# Patient Record
Sex: FEMALE | Race: WHITE | NOT HISPANIC OR LATINO | Employment: OTHER | URBAN - METROPOLITAN AREA
[De-identification: names, ages, dates, MRNs, and addresses within clinical notes are randomized per-mention and may not be internally consistent; named-entity substitution may affect disease eponyms.]

---

## 2018-04-24 ENCOUNTER — HOSPITAL ENCOUNTER (OUTPATIENT)
Facility: CLINIC | Age: 68
Discharge: HOME | End: 2018-04-24
Attending: FAMILY MEDICINE
Payer: MEDICARE

## 2018-04-24 ENCOUNTER — HOSPITAL ENCOUNTER (EMERGENCY)
Facility: HOSPITAL | Age: 68
Discharge: HOME | End: 2018-04-24
Attending: EMERGENCY MEDICINE
Payer: MEDICARE

## 2018-04-24 VITALS
DIASTOLIC BLOOD PRESSURE: 75 MMHG | OXYGEN SATURATION: 100 % | HEIGHT: 60 IN | RESPIRATION RATE: 18 BRPM | HEART RATE: 80 BPM | BODY MASS INDEX: 23.56 KG/M2 | TEMPERATURE: 98.6 F | SYSTOLIC BLOOD PRESSURE: 148 MMHG | WEIGHT: 120 LBS

## 2018-04-24 VITALS
SYSTOLIC BLOOD PRESSURE: 150 MMHG | TEMPERATURE: 98 F | RESPIRATION RATE: 16 BRPM | DIASTOLIC BLOOD PRESSURE: 80 MMHG | OXYGEN SATURATION: 98 % | HEART RATE: 78 BPM

## 2018-04-24 DIAGNOSIS — R21 RASH: Primary | ICD-10-CM

## 2018-04-24 PROCEDURE — 99213 OFFICE O/P EST LOW 20 MIN: CPT | Performed by: FAMILY MEDICINE

## 2018-04-24 PROCEDURE — 99281 EMR DPT VST MAYX REQ PHY/QHP: CPT

## 2018-04-24 RX ORDER — METOPROLOL SUCCINATE 100 MG/1
TABLET, EXTENDED RELEASE ORAL
COMMUNITY
End: 2019-01-30 | Stop reason: ENTERED-IN-ERROR

## 2018-04-24 RX ORDER — LEVOTHYROXINE SODIUM 100 UG/1
TABLET ORAL
COMMUNITY
End: 2019-01-30 | Stop reason: ENTERED-IN-ERROR

## 2018-04-24 RX ORDER — FLUTICASONE PROPIONATE 50 MCG
SPRAY, SUSPENSION (ML) NASAL
COMMUNITY
Start: 2017-05-15 | End: 2018-04-24

## 2018-04-24 RX ORDER — PREGABALIN 100 MG/1
100 CAPSULE ORAL
COMMUNITY
End: 2019-01-30 | Stop reason: ENTERED-IN-ERROR

## 2018-04-24 RX ORDER — LORAZEPAM 0.5 MG/1
TABLET ORAL
COMMUNITY
End: 2019-01-30 | Stop reason: ENTERED-IN-ERROR

## 2018-04-24 RX ORDER — GUAIFENESIN 600 MG/1
600 TABLET, EXTENDED RELEASE ORAL
COMMUNITY
Start: 2017-05-15 | End: 2018-04-24

## 2018-04-24 ASSESSMENT — ENCOUNTER SYMPTOMS
SORE THROAT: 0
TREMORS: 0
WEAKNESS: 0
NAUSEA: 0
VOMITING: 0
LIGHT-HEADEDNESS: 0
FACIAL SWELLING: 0
DIZZINESS: 0
PALPITATIONS: 0
SPEECH DIFFICULTY: 0
VOICE CHANGE: 0
ARTHRALGIAS: 1
SHORTNESS OF BREATH: 0
FEVER: 0
TROUBLE SWALLOWING: 0
DIARRHEA: 0
CONFUSION: 0
SINUS PAIN: 1
SINUS PRESSURE: 1
CHEST TIGHTNESS: 0
CHILLS: 0
ABDOMINAL PAIN: 0
HEADACHES: 1

## 2018-04-24 NOTE — ED ATTESTATION NOTE
Procedures  Physical Exam  Review of Systems    4/24/20187:21 PM  I have personally seen and examined the patient.  I reviewed and agree with the PA/NP/Resident's assessment and plan of care.    My examination, assessment, and plan of care of Randi Saul is as follows:    The patient presents with on the face both malar areas and the bridge of the nose is affected is a little bit underneath the nose as well.  She did put some cream on it last night and she thinks maybe this caused a reaction.  She also had a cortisone shot in her right shoulder.  She has gone to urgent care and she was sent here for further evaluation.    Exam: On exam there is an erythematous rash both malar areas over the bridge of the nose also under the nose.  Area feels a little bit warm to touch.  There are a few blood vessels that look to be slightly dilated.  But all in all a fairly smooth erythematous area.  Does not appear to be causing much distress.    Impression/Plan: Fairly nonspecific rash in the malar area.  It may be a reaction to the cream that she was using yesterday.  She may just let it go and it should get better.  Or she could use some hydrocortisone cream.  Or some Benadryl p.o.       Rojas Guzman MD  04/24/18 1923

## 2018-04-24 NOTE — DISCHARGE INSTRUCTIONS
Please return to the emergency department with any new or concerning symptoms. You should take all medications as prescribed.  We have answered all of your questions and you are competent to understand.  You feel comfortable with the plan as stated.     May have reacted to cream you have been using.  He should avoid this for the next several days.  If you are concerned about additional allergy she will follow-up with an allergist for skin testing.  ED physician at the urgent care mentioned possible rheumatologic causes to her rash and joint pain and this can be further evaluated as an outpatient.  He should follow-up with your PCP for further discussion.  He can use topical corticosteroid creams if needed sold over-the-counter.  Benadryl 25 mg every 8 hours can be used for any itching.  Do not apply anything to the face for the next 24 hours.

## 2018-04-24 NOTE — ED PROVIDER NOTES
HPI     Chief Complaint   Patient presents with   • Medication Reaction       67-year-old female presented emergency department with rash on her face that started around 10:00 in the morning after 15 minutes after putting on a vitamin C type cream.  She reports looking the mirror noticing this redness.  She washed her face and then put makeup on.  Throughout the day she noticed her face stayed red.  She had some burning sensation.  She also noticed a pressure headache over her frontal sinuses.  She reports is common for her in the spring.  She has no vision changes or change in mental status.  She is no trouble swallowing.  She is no swelling of her tongue or her lips.  She was seen at the urgent care and referred to emergency department inferior for reacting to steroids if they were given.  The patient took 25 mg of Benadryl around 10:00 this morning.  She is not out in the sun today.  She recent cortisone shot in her right shoulder for arthritis.  She has been having shoulder pain for several years.             Patient History     Past Medical History:   Diagnosis Date   • Diverticulitis of colon    • Fibromuscular dysplasia (CMS/HCC) (HCC)    • Hypertension    • Hypothyroidism        Past Surgical History:   Procedure Laterality Date   • COLON SURGERY  06/25/2013       History reviewed. No pertinent family history.    Social History   Substance Use Topics   • Smoking status: Never Smoker   • Smokeless tobacco: Never Used   • Alcohol use 4.2 oz/week     7 Glasses of wine per week       Systems Reviewed from Nursing Triage:          Review of Systems     Review of Systems   Constitutional: Negative for chills and fever.   HENT: Positive for sinus pain and sinus pressure. Negative for ear pain, facial swelling, mouth sores, sore throat, tinnitus, trouble swallowing and voice change.    Eyes: Negative for visual disturbance.   Respiratory: Negative for chest tightness and shortness of breath.    Cardiovascular:  Negative for chest pain and palpitations.   Gastrointestinal: Negative for abdominal pain, diarrhea, nausea and vomiting.   Musculoskeletal: Positive for arthralgias (B/L shoulder).   Skin: Positive for rash.   Neurological: Positive for headaches. Negative for dizziness, tremors, speech difficulty, weakness and light-headedness.   Psychiatric/Behavioral: Negative for confusion.        Physical Exam     ED Triage Vitals [04/24/18 1801]   Temp Heart Rate Resp BP SpO2   37 °C (98.6 °F) 80 18 (!) 172/78 100 %      Temp Source Heart Rate Source Patient Position BP Location FiO2 (%) (Set)   Temporal -- Sitting Left upper arm --       Pulse Ox %: 100 % (04/24/18 1926)  Pulse Ox Interpretation: Normal (04/24/18 1926)          Patient Vitals for the past 24 hrs:   BP Temp Temp src Pulse Resp SpO2 Height Weight   04/24/18 1801 (!) 172/78 37 °C (98.6 °F) Temporal 80 18 100 % 1.524 m (5') 54.4 kg (120 lb)           Physical Exam   Constitutional: She is oriented to person, place, and time. She appears well-developed and well-nourished.   HENT:   Head: Normocephalic and atraumatic.   There is mild redness in the maxillary area bilaterally.  Does not appear butterfly like.  No oral swelling or lesions noted.  No angioedema.  No tongue swelling.  Normal voice and tone.   Eyes: Conjunctivae and EOM are normal. Pupils are equal, round, and reactive to light.   Cardiovascular: Normal rate and regular rhythm.    Pulmonary/Chest: Effort normal and breath sounds normal.   Neurological: She is alert and oriented to person, place, and time.   Skin: Skin is warm and dry.   Psychiatric: She has a normal mood and affect.            Procedures    ED Course & MDM     Labs Reviewed - No data to display    No orders to display           MDM         ED Course as of Apr 24 1930 Tue Apr 24, 2018 1905 Impression: Facial rash since 10 AM.  Mild burning.  Plan: Irrigate the face.    The care and workup of this patient was discussed with the  attending physician Dr. Guzman  [MW]   1926 Only minimal erythema noted.  Consider PMR or other rheumatologic causes to the joint pain and rash but this does not need to be an emergent workup.  The patient will follow up with her PCP for further evaluation.  [MW]      ED Course User Index  [MW] GEOVANNA Coles         Clinical Impressions as of Apr 24 1930   Rash     Disposition:  Discharge     GEOVANNA Coles  04/25/18 0807

## 2018-04-25 ASSESSMENT — ENCOUNTER SYMPTOMS
GASTROINTESTINAL NEGATIVE: 1
RESPIRATORY NEGATIVE: 1
EYES NEGATIVE: 1
CONSTITUTIONAL NEGATIVE: 1
DYSURIA: 0
CARDIOVASCULAR NEGATIVE: 1
NEUROLOGICAL NEGATIVE: 1
FREQUENCY: 0

## 2018-04-25 NOTE — ED PROVIDER NOTES
History  No chief complaint on file.    67-year-old female presented emergency department with rash on her face that started around 10:00 in the morning after 15 minutes after putting on a vitamin C/radiant  type cream.  She reports looking the mirror noticing this redness.  She washed her face and then put makeup on.  Throughout the day she noticed her face stayed red.  She had some burning sensation.  She is no trouble swallowing.  Has slight redness of the upper lip.denied sob .   The patient took 25 mg of Benadryl around 10:00 this morning.  She had recent cortisone shot in her right shoulder for arthritis.  She has been having shoulder pain for several years.            Past Medical History:   Diagnosis Date   • Diverticulitis of colon    • Fibromuscular dysplasia (CMS/HCC) (HCC)    • Hypertension    • Hypothyroidism        Past Surgical History:   Procedure Laterality Date   • COLON SURGERY  06/25/2013       History reviewed. No pertinent family history.    Social History   Substance Use Topics   • Smoking status: Never Smoker   • Smokeless tobacco: Never Used   • Alcohol use 4.2 oz/week     7 Glasses of wine per week       Review of Systems   Constitutional: Negative.    HENT: Negative.    Eyes: Negative.    Respiratory: Negative.    Cardiovascular: Negative.    Gastrointestinal: Negative.    Genitourinary: Negative for dysuria, frequency and urgency.   Skin: Positive for rash.   Neurological: Negative.        Physical Exam  ED Triage Vitals [04/24/18 1658]   Temp Heart Rate Resp BP SpO2   36.7 °C (98 °F) 78 16 (!) 150/80 98 %      Temp src Heart Rate Source Patient Position BP Location FiO2 (%) (Set)   -- -- -- -- --       Physical Exam   Constitutional: No distress.   HENT:   Nose: Nose normal.   Mouth/Throat: Oropharynx is clear and moist. No oropharyngeal exudate.   Eyes: Conjunctivae are normal. Pupils are equal, round, and reactive to light.   Cardiovascular: Normal rate, regular rhythm and normal heart  sounds.    Pulmonary/Chest: Effort normal and breath sounds normal.   Skin:   Face : has erythema both cheeks and also just above the upper lip   But no lip swelling            Procedures  Procedures    UC Course  ED Course          Clinical Impressions as of Apr 25 0924   Rash       MDM  Number of Diagnoses or Management Options  Rash:   Diagnosis management comments: Given her concern of may be the steroid injection which she got for her Rt shoulder yesterday caused this or may be the face cream caused it ,she already took benadryl I advised to go to ER ,she and her  agreed ,so I didn't give any steroids here ,no signs of anaphylaxis  Called ER                  Benita Robison MD  04/25/18 4137

## 2019-01-29 ENCOUNTER — TRANSCRIBE ORDERS (OUTPATIENT)
Dept: SCHEDULING | Age: 69
End: 2019-01-29

## 2019-01-29 DIAGNOSIS — I10 ESSENTIAL (PRIMARY) HYPERTENSION: ICD-10-CM

## 2019-01-29 DIAGNOSIS — E03.9 HYPOTHYROIDISM: Primary | ICD-10-CM

## 2019-01-29 DIAGNOSIS — R42 DIZZINESS AND GIDDINESS: ICD-10-CM

## 2019-01-29 DIAGNOSIS — I77.3 ARTERIAL FIBROMUSCULAR DYSPLASIA (CMS/HCC): ICD-10-CM

## 2019-01-30 ENCOUNTER — HOSPITAL ENCOUNTER (INPATIENT)
Facility: HOSPITAL | Age: 69
LOS: 1 days | Discharge: HOME | DRG: 149 | End: 2019-02-01
Attending: EMERGENCY MEDICINE | Admitting: HOSPITALIST
Payer: MEDICARE

## 2019-01-30 DIAGNOSIS — I77.3 FIBROMUSCULAR DYSPLASIA (CMS/HCC): ICD-10-CM

## 2019-01-30 DIAGNOSIS — R42 LIGHTHEADEDNESS: Primary | ICD-10-CM

## 2019-01-30 DIAGNOSIS — E87.1 HYPONATREMIA: ICD-10-CM

## 2019-01-30 PROBLEM — F41.9 ANXIETY: Status: ACTIVE | Noted: 2019-01-30

## 2019-01-30 PROBLEM — I10 ESSENTIAL HYPERTENSION: Status: ACTIVE | Noted: 2019-01-30

## 2019-01-30 LAB
ALBUMIN SERPL-MCNC: 4.6 G/DL (ref 3.4–5)
ALP SERPL-CCNC: 60 IU/L (ref 35–126)
ALT SERPL-CCNC: 20 IU/L (ref 11–54)
ANION GAP SERPL CALC-SCNC: 12 MEQ/L (ref 3–15)
AST SERPL-CCNC: 23 IU/L (ref 15–41)
BASOPHILS # BLD: 0.06 K/UL (ref 0.01–0.1)
BASOPHILS NFR BLD: 1.3 %
BILIRUB SERPL-MCNC: 1.4 MG/DL (ref 0.3–1.2)
BUN SERPL-MCNC: 18 MG/DL (ref 8–20)
CALCIUM SERPL-MCNC: 9.9 MG/DL (ref 8.9–10.3)
CHLORIDE SERPL-SCNC: 97 MEQ/L (ref 98–109)
CO2 SERPL-SCNC: 23 MEQ/L (ref 22–32)
CREAT SERPL-MCNC: 0.8 MG/DL
DIFFERENTIAL METHOD BLD: ABNORMAL
EOSINOPHIL # BLD: 0.03 K/UL (ref 0.04–0.36)
EOSINOPHIL NFR BLD: 0.6 %
ERYTHROCYTE [DISTWIDTH] IN BLOOD BY AUTOMATED COUNT: 11.5 % (ref 11.7–14.4)
GFR SERPL CREATININE-BSD FRML MDRD: >60 ML/MIN/1.73M*2
GLUCOSE SERPL-MCNC: 96 MG/DL (ref 70–99)
HCT VFR BLDCO AUTO: 38.3 %
HGB BLD-MCNC: 13.5 G/DL
IMM GRANULOCYTES # BLD AUTO: 0.02 K/UL (ref 0–0.08)
IMM GRANULOCYTES NFR BLD AUTO: 0.4 %
LYMPHOCYTES # BLD: 0.95 K/UL (ref 1.2–3.5)
LYMPHOCYTES NFR BLD: 20.3 %
MAGNESIUM SERPL-MCNC: 2.1 MG/DL (ref 1.8–2.5)
MCH RBC QN AUTO: 31.4 PG (ref 28–33.2)
MCHC RBC AUTO-ENTMCNC: 35.2 G/DL (ref 32.2–35.5)
MCV RBC AUTO: 89.1 FL (ref 83–98)
MONOCYTES # BLD: 0.26 K/UL (ref 0.28–0.8)
MONOCYTES NFR BLD: 5.6 %
NEUTROPHILS # BLD: 3.36 K/UL (ref 1.7–7)
NEUTS SEG NFR BLD: 71.8 %
NRBC BLD-RTO: 0 %
PDW BLD AUTO: 9.1 FL (ref 9.4–12.3)
PLATELET # BLD AUTO: 224 K/UL
POTASSIUM SERPL-SCNC: 3.5 MEQ/L (ref 3.6–5.1)
PROT SERPL-MCNC: 7.4 G/DL (ref 6–8.2)
RBC # BLD AUTO: 4.3 M/UL (ref 3.93–5.22)
SODIUM SERPL-SCNC: 132 MEQ/L (ref 136–144)
TROPONIN I SERPL-MCNC: <0.02 NG/ML
WBC # BLD AUTO: 4.68 K/UL

## 2019-01-30 PROCEDURE — G0378 HOSPITAL OBSERVATION PER HR: HCPCS

## 2019-01-30 PROCEDURE — 80053 COMPREHEN METABOLIC PANEL: CPT | Performed by: EMERGENCY MEDICINE

## 2019-01-30 PROCEDURE — 96360 HYDRATION IV INFUSION INIT: CPT

## 2019-01-30 PROCEDURE — 85025 COMPLETE CBC W/AUTO DIFF WBC: CPT | Performed by: EMERGENCY MEDICINE

## 2019-01-30 PROCEDURE — 84484 ASSAY OF TROPONIN QUANT: CPT | Performed by: EMERGENCY MEDICINE

## 2019-01-30 PROCEDURE — 63600000 HC DRUGS/DETAIL CODE: Performed by: HOSPITALIST

## 2019-01-30 PROCEDURE — 63700000 HC SELF-ADMINISTRABLE DRUG: Performed by: HOSPITALIST

## 2019-01-30 PROCEDURE — 99285 EMERGENCY DEPT VISIT HI MDM: CPT | Mod: 25

## 2019-01-30 PROCEDURE — 83735 ASSAY OF MAGNESIUM: CPT | Performed by: HOSPITALIST

## 2019-01-30 PROCEDURE — 96372 THER/PROPH/DIAG INJ SC/IM: CPT

## 2019-01-30 PROCEDURE — 63700000 HC SELF-ADMINISTRABLE DRUG: Performed by: PHYSICIAN ASSISTANT

## 2019-01-30 PROCEDURE — 93005 ELECTROCARDIOGRAM TRACING: CPT | Performed by: PHYSICIAN ASSISTANT

## 2019-01-30 PROCEDURE — 25800000 HC PHARMACY IV SOLUTIONS: Performed by: PHYSICIAN ASSISTANT

## 2019-01-30 PROCEDURE — 85025 COMPLETE CBC W/AUTO DIFF WBC: CPT

## 2019-01-30 PROCEDURE — 99220 PR INITIAL OBSERVATION CARE/DAY 70 MINUTES: CPT | Performed by: HOSPITALIST

## 2019-01-30 PROCEDURE — 25800000 HC PHARMACY IV SOLUTIONS: Performed by: HOSPITALIST

## 2019-01-30 PROCEDURE — 36415 COLL VENOUS BLD VENIPUNCTURE: CPT

## 2019-01-30 RX ORDER — METOPROLOL SUCCINATE 25 MG/1
12.5 TABLET, EXTENDED RELEASE ORAL DAILY
Status: DISCONTINUED | OUTPATIENT
Start: 2019-01-30 | End: 2019-01-31

## 2019-01-30 RX ORDER — PREGABALIN 75 MG/1
75 CAPSULE ORAL EVERY EVENING
COMMUNITY

## 2019-01-30 RX ORDER — LORAZEPAM 0.5 MG/1
0.5 TABLET ORAL DAILY
Status: DISCONTINUED | OUTPATIENT
Start: 2019-01-30 | End: 2019-02-01 | Stop reason: HOSPADM

## 2019-01-30 RX ORDER — PREGABALIN 50 MG/1
50 CAPSULE ORAL 2 TIMES DAILY
COMMUNITY

## 2019-01-30 RX ORDER — SODIUM CHLORIDE 9 MG/ML
INJECTION, SOLUTION INTRAVENOUS CONTINUOUS
Status: ACTIVE | OUTPATIENT
Start: 2019-01-30 | End: 2019-01-31

## 2019-01-30 RX ORDER — DEXTROSE 40 %
15-30 GEL (GRAM) ORAL AS NEEDED
Status: DISCONTINUED | OUTPATIENT
Start: 2019-01-30 | End: 2019-02-01 | Stop reason: HOSPADM

## 2019-01-30 RX ORDER — POTASSIUM CHLORIDE 750 MG/1
20 TABLET, FILM COATED, EXTENDED RELEASE ORAL AS NEEDED
Status: DISCONTINUED | OUTPATIENT
Start: 2019-01-30 | End: 2019-02-01 | Stop reason: HOSPADM

## 2019-01-30 RX ORDER — PREGABALIN 75 MG/1
75 CAPSULE ORAL
Status: DISCONTINUED | OUTPATIENT
Start: 2019-01-30 | End: 2019-02-01 | Stop reason: HOSPADM

## 2019-01-30 RX ORDER — POTASSIUM CHLORIDE 14.9 MG/ML
20 INJECTION INTRAVENOUS AS NEEDED
Status: DISCONTINUED | OUTPATIENT
Start: 2019-01-30 | End: 2019-02-01 | Stop reason: HOSPADM

## 2019-01-30 RX ORDER — DEXTROSE 50 % IN WATER (D50W) INTRAVENOUS SYRINGE
25 AS NEEDED
Status: DISCONTINUED | OUTPATIENT
Start: 2019-01-30 | End: 2019-02-01 | Stop reason: HOSPADM

## 2019-01-30 RX ORDER — NITROGLYCERIN 0.4 MG/1
0.4 TABLET SUBLINGUAL EVERY 5 MIN PRN
Status: DISCONTINUED | OUTPATIENT
Start: 2019-01-30 | End: 2019-02-01 | Stop reason: HOSPADM

## 2019-01-30 RX ORDER — LEVOTHYROXINE SODIUM 100 UG/1
100 TABLET ORAL
Status: DISCONTINUED | OUTPATIENT
Start: 2019-01-31 | End: 2019-02-01 | Stop reason: HOSPADM

## 2019-01-30 RX ORDER — MECLIZINE HYDROCHLORIDE 25 MG/1
25 TABLET ORAL 2 TIMES DAILY PRN
Status: ON HOLD | COMMUNITY
End: 2019-04-10

## 2019-01-30 RX ORDER — ATROPINE SULFATE 0.1 MG/ML
0.5 INJECTION INTRAVENOUS EVERY 5 MIN PRN
Status: DISCONTINUED | OUTPATIENT
Start: 2019-01-30 | End: 2019-02-01 | Stop reason: HOSPADM

## 2019-01-30 RX ORDER — METOPROLOL SUCCINATE 100 MG/1
100 TABLET, EXTENDED RELEASE ORAL DAILY
Status: DISCONTINUED | OUTPATIENT
Start: 2019-01-30 | End: 2019-01-30

## 2019-01-30 RX ORDER — LOSARTAN POTASSIUM AND HYDROCHLOROTHIAZIDE 12.5; 5 MG/1; MG/1
2 TABLET ORAL EVERY MORNING
Status: ON HOLD | COMMUNITY
End: 2019-02-01

## 2019-01-30 RX ORDER — IBUPROFEN 200 MG
16-32 TABLET ORAL AS NEEDED
Status: DISCONTINUED | OUTPATIENT
Start: 2019-01-30 | End: 2019-02-01 | Stop reason: HOSPADM

## 2019-01-30 RX ORDER — SENNOSIDES 8.6 MG/1
1 TABLET ORAL 2 TIMES DAILY PRN
Status: DISCONTINUED | OUTPATIENT
Start: 2019-01-30 | End: 2019-02-01 | Stop reason: HOSPADM

## 2019-01-30 RX ORDER — MAGNESIUM SULFATE HEPTAHYDRATE 40 MG/ML
2 INJECTION, SOLUTION INTRAVENOUS AS NEEDED
Status: DISCONTINUED | OUTPATIENT
Start: 2019-01-30 | End: 2019-01-31

## 2019-01-30 RX ORDER — ENOXAPARIN SODIUM 100 MG/ML
40 INJECTION SUBCUTANEOUS
Status: DISCONTINUED | OUTPATIENT
Start: 2019-01-30 | End: 2019-02-01 | Stop reason: HOSPADM

## 2019-01-30 RX ORDER — POTASSIUM CHLORIDE 750 MG/1
40 TABLET, FILM COATED, EXTENDED RELEASE ORAL ONCE
Status: COMPLETED | OUTPATIENT
Start: 2019-01-30 | End: 2019-01-30

## 2019-01-30 RX ORDER — PREGABALIN 100 MG/1
100 CAPSULE ORAL 3 TIMES DAILY
Status: DISCONTINUED | OUTPATIENT
Start: 2019-01-30 | End: 2019-01-30

## 2019-01-30 RX ORDER — LEVOTHYROXINE SODIUM 100 UG/1
100 TABLET ORAL
COMMUNITY

## 2019-01-30 RX ORDER — LORAZEPAM 0.5 MG/1
0.5 TABLET ORAL 3 TIMES DAILY
COMMUNITY

## 2019-01-30 RX ORDER — PREGABALIN 50 MG/1
50 CAPSULE ORAL 2 TIMES DAILY
Status: DISCONTINUED | OUTPATIENT
Start: 2019-01-31 | End: 2019-02-01 | Stop reason: HOSPADM

## 2019-01-30 RX ORDER — ACETAMINOPHEN 325 MG/1
650 TABLET ORAL EVERY 4 HOURS PRN
Status: DISCONTINUED | OUTPATIENT
Start: 2019-01-30 | End: 2019-02-01 | Stop reason: HOSPADM

## 2019-01-30 RX ORDER — METOPROLOL SUCCINATE 25 MG/1
12.5 TABLET, EXTENDED RELEASE ORAL NIGHTLY
COMMUNITY
End: 2019-07-10 | Stop reason: HOSPADM

## 2019-01-30 RX ORDER — POTASSIUM CHLORIDE 750 MG/1
40 TABLET, FILM COATED, EXTENDED RELEASE ORAL AS NEEDED
Status: DISCONTINUED | OUTPATIENT
Start: 2019-01-30 | End: 2019-02-01 | Stop reason: HOSPADM

## 2019-01-30 RX ORDER — ASPIRIN 81 MG/1
81 TABLET ORAL DAILY
Status: DISCONTINUED | OUTPATIENT
Start: 2019-01-30 | End: 2019-02-01 | Stop reason: HOSPADM

## 2019-01-30 RX ADMIN — POTASSIUM CHLORIDE 40 MEQ: 750 TABLET, EXTENDED RELEASE ORAL at 14:07

## 2019-01-30 RX ADMIN — PREGABALIN 75 MG: 75 CAPSULE ORAL at 21:30

## 2019-01-30 RX ADMIN — SODIUM CHLORIDE 1000 ML: 9 INJECTION, SOLUTION INTRAVENOUS at 14:07

## 2019-01-30 RX ADMIN — METOPROLOL SUCCINATE 12.5 MG: 25 TABLET, EXTENDED RELEASE ORAL at 20:11

## 2019-01-30 RX ADMIN — ASPIRIN 81 MG: 81 TABLET, COATED ORAL at 20:11

## 2019-01-30 RX ADMIN — ENOXAPARIN SODIUM 40 MG: 100 INJECTION SUBCUTANEOUS at 20:13

## 2019-01-30 RX ADMIN — ACETAMINOPHEN 650 MG: 325 TABLET ORAL at 18:32

## 2019-01-30 RX ADMIN — SODIUM CHLORIDE: 9 INJECTION, SOLUTION INTRAVENOUS at 20:14

## 2019-01-30 RX ADMIN — LORAZEPAM 0.5 MG: 0.5 TABLET ORAL at 20:13

## 2019-01-30 ASSESSMENT — ENCOUNTER SYMPTOMS
ABDOMINAL PAIN: 0
AGITATION: 0
HEMATURIA: 0
APPETITE CHANGE: 0
NUMBNESS: 0
FREQUENCY: 0
LIGHT-HEADEDNESS: 1
WEAKNESS: 0
COUGH: 0
DYSURIA: 0
SORE THROAT: 0
VOMITING: 0
NECK PAIN: 0
SINUS PRESSURE: 0
JOINT SWELLING: 0
FACIAL ASYMMETRY: 0
SEIZURES: 0
EYE PAIN: 0
DIZZINESS: 1
NAUSEA: 1
HEADACHES: 1
CHILLS: 0
SHORTNESS OF BREATH: 0
DIARRHEA: 0
RHINORRHEA: 0
FEVER: 0
BACK PAIN: 0

## 2019-01-30 NOTE — ED ATTESTATION NOTE
The patient was evaluated and managed by the PA/NP.  I have discussed the case with the PA/NP.  I am in agreement with the ED workup and treatment plan.  I will continue to be available for consultation as needed.     Godshall, Duane K, MD  01/30/19 5956

## 2019-01-30 NOTE — ASSESSMENT & PLAN NOTE
Refused higher dose of Hyzaar  BP is acceptable on regular dose   Also on small dose of Toprol  To monitor

## 2019-01-30 NOTE — ED PROVIDER NOTES
"HPI     Chief Complaint   Patient presents with   • Dizziness       69 y/o F presents to the ED for evaluation of episodic light headedness that has been ongoing for the past 4 days.  Sensation lasts briefly for a few seconds with associated \"inabiity to talk\" and pallor.  Patient states she feels like she is going to pass out during episodes.  Patient was evaluated by PCP A[facility] 2 days ago who instructed patient to go to the ED should an episode recur.  In the ED, patient continues with dizziness she describes as feeling off balance.  Patient admits to frequent headaches and nausea for the past several days of which patient took Ativan and Tylenol this morning.  Denies changes to vision, chest pain, shortness of breath, fever, or any other concerns.  History of hypertension of which patient had Losartan doubled 2 days ago.  Patient notes blood pressure has been elevated for the past 4 days.  Patient scheduled to have US of right kidney for fibromuscular dysplasia but came to the ED instead.  Patient was evaluated by cardiologist Dr Mccrary 2 days ago who write script for patient to have echocardiogram and ultrasound of carotids.  History of partial epilepsy.  Last CT Brain with neurologist Dr Dawkins in 1/2018.           History provided by:  Patient   used: No    Dizziness   Quality:  Lightheadedness  Severity:  Moderate  Onset quality:  Gradual  Duration:  4 days  Timing:  Intermittent  Progression:  Waxing and waning  Chronicity:  New  Relieved by:  Nothing  Worsened by:  Nothing  Ineffective treatments:  None tried  Associated symptoms: headaches and nausea    Associated symptoms: no chest pain, no diarrhea, no shortness of breath, no vomiting and no weakness         Patient History     Past Medical History:   Diagnosis Date   • Diverticulitis of colon    • Fibromuscular dysplasia (CMS/HCC) (HCC)    • Hypertension    • Hypothyroidism        Past Surgical History:   Procedure Laterality Date   • " COLON SURGERY  06/25/2013       History reviewed. No pertinent family history.    Social History   Substance Use Topics   • Smoking status: Never Smoker   • Smokeless tobacco: Never Used   • Alcohol use 4.2 oz/week     7 Glasses of wine per week       Systems Reviewed from Nursing Triage:          Review of Systems     Review of Systems   Constitutional: Negative for appetite change, chills and fever.   HENT: Negative for congestion, ear pain, rhinorrhea, sinus pressure and sore throat.    Eyes: Negative for pain.   Respiratory: Negative for cough and shortness of breath.    Cardiovascular: Negative for chest pain and leg swelling.   Gastrointestinal: Positive for nausea. Negative for abdominal pain, diarrhea and vomiting.   Genitourinary: Negative for dysuria, frequency and hematuria.   Musculoskeletal: Negative for back pain, joint swelling and neck pain.   Skin: Positive for pallor. Negative for rash.   Neurological: Positive for dizziness, light-headedness and headaches. Negative for seizures, syncope, facial asymmetry, weakness and numbness.   Psychiatric/Behavioral: Negative for agitation and behavioral problems.        Physical Exam     ED Triage Vitals [01/30/19 1117]   Temp Heart Rate Resp BP SpO2   36.5 °C (97.7 °F) 83 16 (!) 142/79 99 %      Temp Source Heart Rate Source Patient Position BP Location FiO2 (%) (Set)   Oral -- -- -- --                     Patient Vitals for the past 24 hrs:   BP Temp Temp src Pulse Resp SpO2 Height Weight   01/30/19 1117 (!) 142/79 36.5 °C (97.7 °F) Oral 83 16 99 % 1.524 m (5') 54.4 kg (120 lb)           Physical Exam   Constitutional: She is oriented to person, place, and time. She appears well-developed and well-nourished.   HENT:   Head: Normocephalic and atraumatic.   Eyes: Pupils are equal, round, and reactive to light.   Neck: Neck supple.   Cardiovascular: Normal rate, regular rhythm, normal heart sounds and intact distal pulses.    Pulmonary/Chest: Effort normal and  breath sounds normal.   Abdominal: Soft. There is no tenderness.   Musculoskeletal: Normal range of motion. She exhibits no edema.   Neurological: She is alert and oriented to person, place, and time. She has normal strength. No cranial nerve deficit or sensory deficit.   Skin: Skin is warm and dry.   Psychiatric: She has a normal mood and affect. Her behavior is normal.   Nursing note and vitals reviewed.           ECG 12 lead  Date/Time: 1/30/2019 11:07 AM  Performed by: HARIKA SANDOVAL  Authorized by: GODSHALL, DUANE K     ECG reviewed by ED Physician in the absence of a cardiologist: yes    Rate:     ECG rate:  78    ECG rate assessment: normal    Rhythm:     Rhythm: sinus rhythm    Ectopy:     Ectopy: none    QRS:     QRS axis:  Left        ED Course & MDM     Labs Reviewed   CBC AND DIFFERENTIAL    Narrative:     The following orders were created for panel order CBC and differential.  Procedure                               Abnormality         Status                     ---------                               -----------         ------                     CBC[[id number]]                                                                           Diff Count[[id number]]                                                                      Please view results for these tests on the individual orders.   COMPREHENSIVE METABOLIC PANEL   TROPONIN I   RAINBOW DRAW PANEL    Narrative:     The following orders were created for panel order [place] Draw Panel.  Procedure                               Abnormality         Status                     ---------                               -----------         ------                     RAINBOW RED[[id number]]                                                                   RAINBOW LT BLUE[[id number]]                                                               RAINBOW GOLD[[id number]]                                                                    Please view results for these tests on  the individual orders.   CBC   DIFF COUNT   RAINBOW RED   RAINBOW LT BLUE   RAINBOW GOLD       No orders to display               MDM         ED Course as of Jan 30 1736   Wed Jan 30, 2019   1210 Impression: near syncope  Plan: blood work,   [EG]   1326 Discussed with Dr. Bonilla who did not see any other tests in addition that the patient needed to have done here in the emergency room.  He will discuss with Dr. Mccrary and have the patient follow-up with Dr. Mccrary outpatient for her outpatient appointments that she has scheduled already.  [AMRIT]   1438 I discussed results with patient and administered IV fluids.  Patient reports that even though her labs are not abnormal she does not feel well enough to go home.  I informed her that I can have another doctor come down to evaluate her to get a second opinion if she wants to appeal her discharge.  [AMRIT]   1439 I discussed with Dr. Amezquita who will be in to evaluate the patient.  [AMRIT]   1525 Dr Amezquita in to see the patient  [AMRIT]   1707 Case was discussed with hospitalist.  Reviewed patient's presentation, ED course, and relevant data.  Hospitalist accepts patient on their service and will see / admit pt.    [AMRIT]      ED Course User Index  [EG] Deborah Granger  [AMRIT] Dina Rodriguez PA C         Clinical Impressions as of Jan 30 1736   Lightheadedness   Hyponatremia      By signing my name below, I, Deborah Granger, attest that this documentation has been prepared under the direction and in the presence of ERIN Rodriguez PA-C    1/30/2019 11:58 AM      Dina CARVALHO, personally performed the services described in this documentation, as documented by the scribe in my presence, and it is both accurate and complete.  1/30/2019 3:23 PM         Deborah Granger  01/30/19 1209       Dina Rodriguez PA C  01/30/19 1523       Dina Rodriguez PA C  01/30/19 1736

## 2019-01-30 NOTE — ASSESSMENT & PLAN NOTE
She is having intermittent short episodes of lightheadedness and feels that she is going to pass out.  She is also unable to talk during these episodes.   The patient and her  do not believe they are seizures but rather similar to symptoms the patient had before renal artery stenting  As per epilepsy entry

## 2019-01-31 PROBLEM — G40.909 EPILEPSY (CMS/HCC): Status: ACTIVE | Noted: 2019-01-31

## 2019-01-31 PROBLEM — I77.3 FIBROMUSCULAR DYSPLASIA (CMS/HCC): Status: ACTIVE | Noted: 2019-01-31

## 2019-01-31 PROBLEM — R42 LIGHTHEADEDNESS: Status: ACTIVE | Noted: 2019-01-31

## 2019-01-31 LAB
ANION GAP SERPL CALC-SCNC: 7 MEQ/L (ref 3–15)
ATRIAL RATE: 80
BUN SERPL-MCNC: 17 MG/DL (ref 8–20)
CALCIUM SERPL-MCNC: 9.2 MG/DL (ref 8.9–10.3)
CHLORIDE SERPL-SCNC: 110 MEQ/L (ref 98–109)
CO2 SERPL-SCNC: 21 MEQ/L (ref 22–32)
CREAT SERPL-MCNC: 0.6 MG/DL
ERYTHROCYTE [DISTWIDTH] IN BLOOD BY AUTOMATED COUNT: 11.7 % (ref 11.7–14.4)
GFR SERPL CREATININE-BSD FRML MDRD: >60 ML/MIN/1.73M*2
GLUCOSE SERPL-MCNC: 86 MG/DL (ref 70–99)
HCT VFR BLDCO AUTO: 37.2 %
HGB BLD-MCNC: 12.7 G/DL
MCH RBC QN AUTO: 31.1 PG (ref 28–33.2)
MCHC RBC AUTO-ENTMCNC: 34.1 G/DL (ref 32.2–35.5)
MCV RBC AUTO: 91.2 FL (ref 83–98)
P AXIS: 51
PDW BLD AUTO: 9.2 FL (ref 9.4–12.3)
PLATELET # BLD AUTO: 220 K/UL
POTASSIUM SERPL-SCNC: 4.2 MEQ/L (ref 3.6–5.1)
PR INTERVAL: 174
QRS DURATION: 74
QT INTERVAL: 390
QTC CALCULATION(BAZETT): 444
R AXIS: -18
RBC # BLD AUTO: 4.08 M/UL (ref 3.93–5.22)
SODIUM SERPL-SCNC: 138 MEQ/L (ref 136–144)
T WAVE AXIS: 37
VENTRICULAR RATE: 78
WBC # BLD AUTO: 5.03 K/UL

## 2019-01-31 PROCEDURE — 63700000 HC SELF-ADMINISTRABLE DRUG: Performed by: HOSPITALIST

## 2019-01-31 PROCEDURE — 63600000 HC DRUGS/DETAIL CODE: Performed by: HOSPITALIST

## 2019-01-31 PROCEDURE — 63700000 HC SELF-ADMINISTRABLE DRUG: Performed by: PHYSICIAN ASSISTANT

## 2019-01-31 PROCEDURE — 20600000 HC ROOM AND CARE INTERMEDIATE/TELEMETRY

## 2019-01-31 PROCEDURE — 63700000 HC SELF-ADMINISTRABLE DRUG: Performed by: INTERNAL MEDICINE

## 2019-01-31 PROCEDURE — 80048 BASIC METABOLIC PNL TOTAL CA: CPT | Performed by: HOSPITALIST

## 2019-01-31 PROCEDURE — 85027 COMPLETE CBC AUTOMATED: CPT | Performed by: HOSPITALIST

## 2019-01-31 PROCEDURE — G0378 HOSPITAL OBSERVATION PER HR: HCPCS

## 2019-01-31 PROCEDURE — 99232 SBSQ HOSP IP/OBS MODERATE 35: CPT | Performed by: HOSPITALIST

## 2019-01-31 PROCEDURE — 36415 COLL VENOUS BLD VENIPUNCTURE: CPT | Performed by: HOSPITALIST

## 2019-01-31 RX ORDER — CARVEDILOL 3.12 MG/1
3.12 TABLET ORAL 2 TIMES DAILY WITH MEALS
Status: DISCONTINUED | OUTPATIENT
Start: 2019-01-31 | End: 2019-01-31

## 2019-01-31 RX ORDER — LOSARTAN POTASSIUM 50 MG/1
50 TABLET ORAL DAILY
Status: DISCONTINUED | OUTPATIENT
Start: 2019-01-31 | End: 2019-02-01 | Stop reason: HOSPADM

## 2019-01-31 RX ORDER — LORAZEPAM 0.5 MG/1
0.5 TABLET ORAL ONCE
Status: COMPLETED | OUTPATIENT
Start: 2019-01-31 | End: 2019-01-31

## 2019-01-31 RX ORDER — METOPROLOL SUCCINATE 25 MG/1
12.5 TABLET, EXTENDED RELEASE ORAL NIGHTLY
Status: DISCONTINUED | OUTPATIENT
Start: 2019-01-31 | End: 2019-02-01 | Stop reason: HOSPADM

## 2019-01-31 RX ORDER — HYDROCHLOROTHIAZIDE 12.5 MG/1
12.5 TABLET ORAL DAILY
Status: DISCONTINUED | OUTPATIENT
Start: 2019-01-31 | End: 2019-02-01 | Stop reason: HOSPADM

## 2019-01-31 RX ADMIN — HYDROCHLOROTHIAZIDE 12.5 MG: 12.5 TABLET ORAL at 15:12

## 2019-01-31 RX ADMIN — LEVOTHYROXINE SODIUM 100 MCG: 100 TABLET ORAL at 05:43

## 2019-01-31 RX ADMIN — PREGABALIN 50 MG: 50 CAPSULE ORAL at 05:43

## 2019-01-31 RX ADMIN — METOPROLOL SUCCINATE 12.5 MG: 25 TABLET, EXTENDED RELEASE ORAL at 21:55

## 2019-01-31 RX ADMIN — LOSARTAN POTASSIUM 50 MG: 50 TABLET, FILM COATED ORAL at 15:12

## 2019-01-31 RX ADMIN — PREGABALIN 75 MG: 75 CAPSULE ORAL at 21:55

## 2019-01-31 RX ADMIN — ENOXAPARIN SODIUM 40 MG: 100 INJECTION SUBCUTANEOUS at 17:41

## 2019-01-31 RX ADMIN — LORAZEPAM 0.5 MG: 0.5 TABLET ORAL at 09:06

## 2019-01-31 RX ADMIN — PREGABALIN 50 MG: 50 CAPSULE ORAL at 12:56

## 2019-01-31 RX ADMIN — LORAZEPAM 0.5 MG: 0.5 TABLET ORAL at 21:56

## 2019-01-31 RX ADMIN — ASPIRIN 81 MG: 81 TABLET, COATED ORAL at 09:06

## 2019-01-31 NOTE — ASSESSMENT & PLAN NOTE
The patient her  do not believe these current episodes are seizures, and they would like her blood pressure to be monitored.  If she has additional episodes in the hospital, and these are not related to elevated blood pressure spikes, I would suggest a transfer to the [place] epilepsy unit for continuous EEG monitoring, seizure classification, and to adjust her current medications.  She is a great difficulty tolerating medications in the past.  She will continue Lyrica 175 mg daily.  Ativan is not a great medicine for her anxiety, as she will develop tolerance to it, and it is typically used as a rescue medicine for patients with uncontrolled seizures.  May continued in the hospital for now.  She does plan on following up with Dr. Fuller at [place].  She does not drive.

## 2019-01-31 NOTE — CONSULTS
NEUROLOGICAL CONSULTATION      PATIENT NAME:  Randi Saul      YOB: 1950   AGE:  68 y.o.      GENDER: female  MRN:  [id number]          PATIENT #: [id number]    Reason for Consultation:     Requesting service:     HISTORY   68-year-old woman made a [facility] for labile hypertension.  She has a history of refractory epilepsy.    Her primary care physician sent her to the emergency department with recurring episodes of ability to talk, and feeling like she was going to pass out.  Her losartan was increased, and blood pressure has been elevated for the past 4 days with associated headache.   and patient are convinced that her episodes are related to fluctuations in her blood pressure, which I believe is related to her fiber muscular dysplasia, status post renal stenting.      With the onset of menopause, she began having staring episodes.  2012, she was diagnosed with epilepsy by Dr. Chirag Gonzales.  Subsequently followed by Dr. David Fuller at [place], and more recently Dr. Dwight Dawkins.  She has not been able to tolerate medications for her seizures, specifically she has been on Trileptal, Keppra, Dilantin, and lamotrigine.  Currently she is taking Lyrica under 75 mg in divided doses.  Her psychiatrist is also prescribing Ativan 0.5 mg which she is taking once or twice daily for anxiety.  She had never been admitted to [place] for monitoring and seizure classification.  She has wondered if she is a candidate for epilepsy surgery as she is spoken to a peer who has done very well.    With her typical seizure, she will have lip smacking, right hand automatisms, and staring episodes lasting for only a minute.  The patient her  do not believe these current episodes are seizures, and they would like her blood pressure to be monitored.    PAST MEDICAL HISTORY     Past Medical History:   Diagnosis Date   • Diverticulitis of colon    • Fibromuscular dysplasia (CMS/HCC) (HCC)     • Hypertension    • Hypothyroidism        PAST SURGICAL HISTORY     Past Surgical History:   Procedure Laterality Date   • COLON SURGERY  06/25/2013        FAMILY HISTORY   History reviewed. No pertinent family history.    SOCIAL HISTORY     Social History   Substance Use Topics   • Smoking status: Never Smoker   • Smokeless tobacco: Never Used   • Alcohol use 4.2 oz/week     7 Glasses of wine per week       ALLERGIES     Allergies   Allergen Reactions   • Azithromycin Diarrhea   • Levetiracetam      Other reaction(s): sick  cannot tolerate    • Oxycodone GI intolerance   • Amoxicillin Trihydrate Rash   • Potassium Clavulanate Rash   • Sulfa (Sulfonamide Antibiotics) Rash       MEDICATIONS   Home Medication:  •  levothyroxine, Take 100 mcg by mouth daily.  •  LORazepam, Take 0.5 mg by mouth 2 (two) times a day as needed for anxiety.  •  losartan-hydrochlorothiazide, Take 2 tablets by mouth every morning. Increased from 1 tab to 2 tabs on 1/28/2019  •  meclizine, Take 25 mg by mouth 2 (two) times a day as needed for dizziness.  •  metoprolol succinate XL, Take 12.5 mg by mouth nightly.    •  multivit-minerals/folic acid (ADULT ONE DAILY GUMMIES ORAL), Take 1 Dose by mouth every morning.  •  pregabalin, Take 50 mg by mouth 2 (two) times a day. In addition to 75 mg Lyrica each evening.  •  pregabalin, Take 75 mg by mouth every evening. In addition to Lyrica 50 mg every morning and afternoon    MEDICATIONS:  Infusions:          Scheduled:     aspirin 81 mg oral Daily   enoxaparin 40 mg subcutaneous Daily (6p)   levothyroxine 100 mcg oral Daily (6:30a)   LORazepam 0.5 mg oral Daily   losartan-hydrochlorothiazide (HYZAAR) 100-25 combo dose  oral Daily   metoprolol succinate XL 12.5 mg oral Daily   pregabalin 50 mg oral BID   pregabalin 75 mg oral Daily (9p)       REVIEW OF SYSTEMS   13 point review of systems completed. Negative except for above mentioned history.    PHYSICAL EXAMINATION   Temperature: Temp (24hrs),  Av.8 °C (98.3 °F), Min:36.5 °C (97.7 °F), Max:37.5 °C (99.5 °F)     BP Max:  Systolic (24hrs), Av , Min:133 , Max:188      BP Harp:  Diastolic (24hrs), Av, Min:66, Max:87    Recent:      Patient Vitals for the past 4 hrs:   BP Temp Temp src Pulse Resp SpO2   19 0905 (!) 147/75 - - - - -   19 0754 (!) 174/87 36.8 °C (98.2 °F) Oral 73 20 99 %   19 0750 - - - 74 - -       General: No acute distress, appears stated age.   Neck: Supple, no carotid bruits  Cardiovascular: Rate and rhythm are regular  Lungs: Normal breath sounds   Abdomen: Soft, non-tender   Neurologic Exam:   Mental status: Alert and oriented x 3. Speech is clear and fluent. Memory is intact.   Cranial nerves: Pupils are 3 mm bilaterally and equally reactive to light.  Extraocular movements are intact without nystagmus.  Visual fields by confrontation are full.  Facial strength is symmetric.  Facial sensation is intact to light touch. Hearing is intact. Tongue is midline on protrusion with symmetric palate elevation.  Strength: 5/5 throughout.  No pronator drift.  Tone is normal.  Sensation: Intact to light touch.   Reflexes: 2+ and symmetric with downgoing toes  Coordination: Finger to nose and heel to shin are normal without ataxia  Gait: Normal    Physical Exam  Neurologic Exam    Diagnostic Data:     Labs reviewed-  Lab Results   Component Value Date    WBC 5.03 2019    HGB 12.7 2019    HCT 37.2 2019     2019    ALT 20 2019    AST 23 2019     2019    K 4.2 2019     (H) 2019    CREATININE 0.6 2019    BUN 17 2019    CO2 21 (L) 2019     Imaging reviewed  No results found.    Labs:  No results found for: PHENYTOIN, PHENOBARB, VALPROATE, CBMZ  No results found for: [id number]  No results found for: FOLATE  No results found for: TSH      IMPRESSION/PLAN     Epilepsy (CMS/HCC) ([facility])   Assessment & Plan    The patient her  do not  believe these current episodes are seizures, and they would like her blood pressure to be monitored.  If she has additional episodes in the hospital, and these are not related to elevated blood pressure spikes, I would suggest a transfer to the [place] epilepsy unit for continuous EEG monitoring, seizure classification, and to adjust her current medications.  She is a great difficulty tolerating medications in the past.  She will continue Lyrica 175 mg daily.  Ativan is not a great medicine for her anxiety, as she will develop tolerance to it, and it is typically used as a rescue medicine for patients with uncontrolled seizures.  May continued in the hospital for now.  She does plan on following up with Dr. Fuller at [place].  She does not drive.                AUTHOR:  Santo Perry, DO    PRIMARY CARE PHYSICIAN   Shailesh Badillo MD  [phone]

## 2019-01-31 NOTE — PLAN OF CARE
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

## 2019-01-31 NOTE — UM PHYSICIAN REVIEW NOTE
Inpatient appropriate for this 69 yo patient with refractory epilepsy who will require 2nd midnight in hospital for continued optimization and is recommended for transfer to [place].

## 2019-01-31 NOTE — PLAN OF CARE
Problem: Patient Care Overview  Goal: Plan of Care Review  Outcome: Ongoing (interventions implemented as appropriate)   01/31/19 6775   Coping/Psychosocial   Plan Of Care Reviewed With family;patient;spouse   Plan of Care Review   Progress improving   Outcome Summary educated patient on indication for neuro and cardiology consult, patient denies feelings of lightheadedness, patient denied pain today, BP less labile        Problem: Fall Risk (Adult)  Goal: Identify Related Risk Factors and Signs and Symptoms  Outcome: Ongoing (interventions implemented as appropriate)    Goal: Absence of Falls  Outcome: Ongoing (interventions implemented as appropriate)

## 2019-01-31 NOTE — PATIENT CARE CONFERENCE
Care Progression Rounds Note  Date: 1/31/2019  Time: 10:56 AM     Patient Name: Randi Saul     Medical Record Number: [id number]   YOB: 1950  Sex: Female      Room/Bed: 3027    Admitting Diagnosis: Lightheadedness [R42]  Hyponatremia [E87.1]  Lightheaded [R42]   Admit Date/Time: 1/30/2019 11:44 AM    Primary Diagnosis: No Principal Problem: There is no principal problem currently on the Problem List. Please update the Problem List and refresh.  Principal Problem: No Principal Problem: There is no principal problem currently on the Problem List. Please update the Problem List and refresh.    GMLOS: pending  Anticipated Discharge Date: 2/1/2019    AM-PAC  Mobility Score:      Discharge Planning:       Barriers to Discharge:  Barriers to Discharge: Test pending, Consultant recommendations pending  Comment: renal U/S    Participants:  , physical therapy, social work/services, nursing

## 2019-01-31 NOTE — PLAN OF CARE
"Problem: Patient Care Overview  Goal: Plan of Care Review  Outcome: Ongoing (interventions implemented as appropriate)   01/31/19 0335   Coping/Psychosocial   Plan Of Care Reviewed With patient   Plan of Care Review   Progress no change   Outcome Summary Pt admitted to  for obs. Pt denies being dizzy or lightheaded. Independent with ambulation in room. IVF running at 80 ml/hr. VSS, will continue to monitor.      Goal: Individualization & Mutuality  Outcome: Ongoing (interventions implemented as appropriate)   01/31/19 0335   Individualization   Patient Specific Preferences Prefers to be called \"Rodie\"       Problem: Fall Risk (Adult)  Goal: Identify Related Risk Factors and Signs and Symptoms  Outcome: Ongoing (interventions implemented as appropriate)   01/31/19 0335   Fall Risk   Related Risk Factors (Fall Risk) environment unfamiliar;age-related changes;depression/anxiety   Signs and Symptoms (Fall Risk) presence of risk factors     Goal: Absence of Falls  Outcome: Ongoing (interventions implemented as appropriate)   01/31/19 0335   Fall Risk (Adult)   Absence of Falls making progress toward outcome         "

## 2019-01-31 NOTE — CONSULTS
CARDIOLOGY CONSULT NOTE    REASON FOR CONSULT:  Dizziness, hypertension    CONSULT FROM: Rosanna Adame MD    HPI:  Randi Saul is a 68 y.o. female who was admitted with lightheadedness. She is known to Dr. AZUL Mccrary. Her cardiovascular history includes hypertension, fibromuscular dysplasia, renal artery stenosis s/p stent.     She has been having intermittent episodes of lightheadedness over the past week. She was found to have very high blood pressure and was seen by her PMD and Dr. Mccrary earlier this week. Her losartan-HCTZ was doubled and she was scheduled for a renal artery Doppler ultrasound to re-evaluate her renal artery stenosis. On the drive over, she felt near syncopal and her  said she was pale and she did not recognize him. She denies true syncope. Her lightheadedness episodes are very transient--lasting only a few seconds to a minute. She tells me that she has felt better today. She declined her losartan-HCTZ today as she felt like the double dose did not help. She denies chest pain, dyspnea. She has a history of epilepsy but states that this is different.     # cardiac risk factors: HTN, FMD  # no CAD/CHF  # renal artery stent    Cath: 3/2012: no CAD      PAST MEDICAL & SURGICAL HISTORY:  Past Medical History:   Diagnosis Date   • Diverticulitis of colon    • Fibromuscular dysplasia (CMS/HCC) (HCC)    • Hypertension    • Hypothyroidism      Past Surgical History:   Procedure Laterality Date   • COLON SURGERY  06/25/2013       MEDICATIONS:  Home medications    •  levothyroxine, Take 100 mcg by mouth daily.  •  LORazepam, Take 0.5 mg by mouth 2 (two) times a day as needed for anxiety.  •  losartan-hydrochlorothiazide, Take 2 tablets by mouth every morning. Increased from 1 tab to 2 tabs on 1/28/2019  •  meclizine, Take 25 mg by mouth 2 (two) times a day as needed for dizziness.  •  metoprolol succinate XL, Take 12.5 mg by mouth nightly.    •  multivit-minerals/folic acid (ADULT ONE DAILY  GUMMIES ORAL), Take 1 Dose by mouth every morning.  •  pregabalin, Take 50 mg by mouth 2 (two) times a day. In addition to 75 mg Lyrica each evening.  •  pregabalin, Take 75 mg by mouth every evening. In addition to Lyrica 50 mg every morning and afternoon    In hospital medications    aspirin 81 mg oral Daily   enoxaparin 40 mg subcutaneous Daily (6p)   hydrochlorothiazide 12.5 mg oral Daily   levothyroxine 100 mcg oral Daily (6:30a)   LORazepam 0.5 mg oral Daily   losartan 50 mg oral Daily   metoprolol succinate XL 12.5 mg oral Nightly   pregabalin 50 mg oral BID   pregabalin 75 mg oral Daily (9p)       ALLERGIES:  Azithromycin; Levetiracetam; Oxycodone; Amoxicillin trihydrate; Potassium clavulanate; and Sulfa (sulfonamide antibiotics)    SOCIAL HISTORY:  No smoking. No alcohol.     FAMILY HISTORY:  No premature CAD    REVIEW OF SYSTEMS:  Constitutional: denies weight gain and weight loss  HEENT: denies irritation sore throat and hoarseness, occasional blurred vision  Respiratory: denies dyspnea on exertion and chest pain/tightness  Cardiovascular: denies chest pain, dyspnea, orthopnea, PND, edema, syncope  Gastrointestinal: denies nausea, vomiting and diarrhea  Genitourinary: denies painful urination and frequency  Integument: denies itching and dryness  Hematologic/lymphatic:  denies easy bruising and petechiae  Musculoskeletal: denies arthalgias, myalgias  Neurological: denies vertigo and tremors  Behavioral/Psych: denies anxiety and depression  Endocrine: denies cold intolerance and heat intolerance    PHYSICAL EXAM:  Temp:  [36.5 °C (97.7 °F)-37.5 °C (99.5 °F)] 36.9 °C (98.4 °F)  Heart Rate:  [61-86] 74  Resp:  [17-21] 18  BP: (133-188)/(66-87) 146/82    Intake/Output Summary (Last 24 hours) at 01/31/19 1402  Last data filed at 01/31/19 0641   Gross per 24 hour   Intake              480 ml   Output                0 ml   Net              480 ml     Physical exam:  General appearance: alert, appears  stated age and cooperative  Head: without obvious abnormality  Eyes: PERRLA, extraocular movements intact  Neck: no JVD, carotid bruits, thyromegaly  Lungs: clear to auscultation bilaterally, no crackles or wheezing  Heart: regular rate and rhythm, S1-S2 normal, no murmurs, clicks, rubs or gallops  Abdomen: soft, non-tender; bowel sounds normal; no masses  Extremities: no edema, peripheral pulses present  Skin: Skin color, texture, turgor normal. No rashes or lesions  Neurologic: Alert and oriented X 3, no focal deficits    LABS:     Results from last 7 days  Lab Units 01/31/19  0450 01/30/19  1214 01/30/19  1213   SODIUM mEQ/L 138  --  132*   POTASSIUM mEQ/L 4.2  --  3.5*   MAGNESIUM mg/dL  --   --  2.1   CHLORIDE mEQ/L 110*  --  97*   CO2 mEQ/L 21*  --  23   BUN mg/dL 17  --  18   CREATININE mg/dL 0.6  --  0.8   AST IU/L  --   --  23   ALT IU/L  --   --  20   TROPONIN I ng/mL  --  <0.02  --        Results from last 7 days  Lab Units 01/31/19  0450 01/30/19  1213   WBC K/uL 5.03 4.68   HEMOGLOBIN g/dL 12.7 13.5   HEMATOCRIT % 37.2 38.3   PLATELETS K/uL 220 224     No results found for: HGBA1C, TSH            IMAGING:  none    ECG:   NSR, PRWP. Normal QT.     TELEMETRY:  Normal sinus rhythm      ASSESSMENT AND PLAN:  1. Dizziness/Lightheadedness: unclear of etiology at this time. No arrhythmia seen. Her blood pressure has not been low. It was high yesterday afternoon and today it was mildly increased (but she declined her losartan HCTZ). She is willing to take the lower dose of it and I have restarted that. Continue to monitor on tele. Exclude a neurologic cause (seen by neuro). Evaluate her renal arteries. Can check echo. Check orthostatics. There is no definitive cardiac etiology seen at this time. Will have Dr. Mccrary review and follow up as well.    2. Hypertension: reports that it has been labile. She had her losartan-HCTZ doubled but says her BP was still high. Now refusing to take the higher dose but will take  the 50/12.5 dose. I will restart this to get her BP lower. Continue metoprolol. Consider adding amlodipine if more antihypertensive treatment needed. I note that the higher dose of HCTZ likely caused her lower sodium and potassium. This has been repleted. Follow up in AM.     3. Renal artery stenosis: the labile BP is reminiscent of her prior CHRISTELLE. A Doppler US was ordered.     Will review with Dr. Mccrary who will see in follow up tomorrow.       Beto Matos MD  1/31/2019    Primary Care Physician: Shailesh Badillo MD

## 2019-02-01 ENCOUNTER — APPOINTMENT (INPATIENT)
Dept: RADIOLOGY | Facility: HOSPITAL | Age: 69
DRG: 149 | End: 2019-02-01
Attending: HOSPITALIST
Payer: MEDICARE

## 2019-02-01 ENCOUNTER — APPOINTMENT (INPATIENT)
Dept: CARDIOLOGY | Facility: HOSPITAL | Age: 69
DRG: 149 | End: 2019-02-01
Attending: NURSE PRACTITIONER
Payer: MEDICARE

## 2019-02-01 ENCOUNTER — APPOINTMENT (INPATIENT)
Dept: CARDIOLOGY | Facility: HOSPITAL | Age: 69
DRG: 149 | End: 2019-02-01
Attending: INTERNAL MEDICINE
Payer: MEDICARE

## 2019-02-01 VITALS
SYSTOLIC BLOOD PRESSURE: 141 MMHG | WEIGHT: 122 LBS | BODY MASS INDEX: 23.95 KG/M2 | HEIGHT: 60 IN | DIASTOLIC BLOOD PRESSURE: 78 MMHG | RESPIRATION RATE: 20 BRPM | OXYGEN SATURATION: 98 % | HEART RATE: 78 BPM | TEMPERATURE: 98.8 F

## 2019-02-01 LAB
ABDOMINAL MID AORTA VEL: 95.4 CM/S
ANION GAP SERPL CALC-SCNC: 8 MEQ/L (ref 3–15)
AORTIC ROOT ANNULUS: 2.8 CM
ASCENDING AORTA: 2.9 CM
AV REG PEAK VEL: 3.95 M/S
AV REGURGITATION PRESSURE HALF TIME: 586 MS
BACTERIA URNS QL MICRO: ABNORMAL /HPF
BILIRUB UR QL STRIP.AUTO: NEGATIVE MG/DL
BSA FOR ECHO PROCEDURE: 1.53 M2
BSA FOR ECHO PROCEDURE: 1.53 M2
BUN SERPL-MCNC: 17 MG/DL (ref 8–20)
CALCIUM SERPL-MCNC: 9.2 MG/DL (ref 8.9–10.3)
CHLORIDE SERPL-SCNC: 106 MEQ/L (ref 98–109)
CLARITY UR REFRACT.AUTO: CLEAR
CO2 SERPL-SCNC: 22 MEQ/L (ref 22–32)
COLOR UR AUTO: YELLOW
CREAT SERPL-MCNC: 0.6 MG/DL
E WAVE DECELERATION TIME: 285 MS
E/A RATIO: 0.7
E/E' RATIO: 9.1
EDV (BP): 59.1 CM3
EF (A4C): 56.3 %
EF A2C: 53.1 %
EJECTION FRACTION: 54.7 %
EST RIGHT VENT SYSTOLIC PRESSURE BY TRICUSPID REGURGITATION JET: 25 MMHG
ESV (BP): 26.8 CM3
FRACTIONAL SHORTENING: 25.7 %
GFR SERPL CREATININE-BSD FRML MDRD: >60 ML/MIN/1.73M*2
GLUCOSE SERPL-MCNC: 92 MG/DL (ref 70–99)
GLUCOSE UR STRIP.AUTO-MCNC: NEGATIVE MG/DL
HGB UR QL STRIP.AUTO: NEGATIVE
HYALINE CASTS #/AREA URNS LPF: ABNORMAL /LPF
INTERVENTRICULAR SEPTUM: 1.02 CM
KETONES UR STRIP.AUTO-MCNC: NEGATIVE MG/DL
LA ESV (BP): 28.1 CM3
LA ESV INDEX (A2C): 24.12 CM3/M2
LA ESV INDEX (BP): 18.37 CM3/M2
LA/AORTA RATIO: 0.89
LAAS-AP2: 14.1 CM2
LAAS-AP4: 11.4 CM2
LAD 2D: 2.5 CM
LALD A4C: 4.46 CM
LALD A4C: 4.52 CM
LAV-S: 36.9 CM3
LEFT ATRIUM VOLUME INDEX: 14.77 CM3/M2
LEFT ATRIUM VOLUME: 22.6 CM3
LEFT INTERNAL DIMENSION IN SYSTOLE: 2.98 CM (ref 2.24–3.39)
LEFT KIDNEY LENGTH: 9.87 CM
LEFT RENAL ARCUATES LOWER POLE EDV: 5.9 CM/S
LEFT RENAL ARCUATES LOWER POLE PSV: 15.1 CM/S
LEFT RENAL ARCUATES LOWER POLE RI: 0.61
LEFT RENAL ARCUATES UPPER POLE EDV: 6.5 CM/S
LEFT RENAL ARCUATES UPPER POLE PSV: 13.5 CM/S
LEFT RENAL ARCUATES UPPER POLE RI: 0.52
LEFT RENAL DIST DIAS: 29.2 CM/S
LEFT RENAL DIST RAR: 0.58
LEFT RENAL DIST SYS: 55.6 CM/S
LEFT RENAL MID DIAS: 75.9 CM/S
LEFT RENAL MID RAR: 1.65
LEFT RENAL MID SYS: 157.1 CM/S
LEFT RENAL ORIGIN DIAS: 53.4 CM/S
LEFT RENAL ORIGIN RAR: 1.1
LEFT RENAL ORIGIN SYS: 105.2 CM/S
LEFT RENAL PROX DIAS: 89.3 CM/S
LEFT RENAL PROX RAR: 2.77
LEFT RENAL PROX SYS: 263.9 CM/S
LEFT RENAL SEGMENTAL LOWER POLE EDV: 11.6 CM/S
LEFT RENAL SEGMENTAL LOWER POLE PSV: 33 CM/S
LEFT RENAL SEGMENTAL LOWER POLE RI: 0.65
LEFT RENAL SEGMENTAL UPPER POLE EDV: 8.7 CM/S
LEFT RENAL SEGMENTAL UPPER POLE PSV: 21.8 CM/S
LEFT RENAL SEGMENTAL UPPER POLE RI: 0.6
LEFT VENTRICLE DIASTOLIC VOLUME INDEX: 39.8 CM3/M2
LEFT VENTRICLE DIASTOLIC VOLUME: 60.9 CM3
LEFT VENTRICLE SYSTOLIC VOLUME INDEX: 17.39 CM3/M2
LEFT VENTRICLE SYSTOLIC VOLUME: 26.6 CM3
LEFT VENTRICULAR INTERNAL DIMENSION IN DIASTOLE: 4.01 CM (ref 3.77–5.24)
LEFT VENTRICULAR POSTERIOR WALL IN END DIASTOLE: 1.05 CM (ref 0.48–0.89)
LEUKOCYTE ESTERASE UR QL STRIP.AUTO: ABNORMAL
LT AVERAGE LEFT KIDNEY LENGTH: 9.77 CM
LT LEFT KIDNEY LENGTH 2: 9.74 CM
LT LEFT KIDNEY LENGTH 3: 9.71 CM
LT RENAL A DIST RI: 0.47
LT RENAL A MID RI: 0.52
LT RENAL A ORIGIN RI: 0.49
LT RENAL A PROX RI: 0.66
LV DIASTOLIC VOLUME: 55.5 CM3
LV ESV (APICAL 2 CHAMBER): 26 CM3
LVAD-AP2: 21 CM2
LVAD-AP4: 22 CM2
LVAS-AP2: 12.5 CM2
LVAS-AP4: 12.9 CM2
LVEDVI(A2C): 36.27 CM3/M2
LVEDVI(BP): 38.63 CM3/M2
LVESVI(A2C): 16.99 CM3/M2
LVESVI(BP): 17.52 CM3/M2
LVLD-AP2: 6.94 CM
LVLD-AP4: 6.67 CM
LVLS-AP2: 5.4 CM
LVLS-AP4: 5.63 CM
MAGNESIUM SERPL-MCNC: 2.1 MG/DL (ref 1.8–2.5)
MV E'TISSUE VEL-MED: 0.06 M/S
MV PEAK A VEL: 0.76 M/S
MV PEAK E VEL: 0.54 M/S
NITRITE UR QL STRIP.AUTO: NEGATIVE
PH UR STRIP.AUTO: 6 [PH]
POSTERIOR WALL: 1.05 CM
POTASSIUM SERPL-SCNC: 3.4 MEQ/L (ref 3.6–5.1)
PROT UR QL STRIP.AUTO: NEGATIVE
RAP: 10 MMHG
RBC #/AREA URNS HPF: ABNORMAL /HPF
RIGHT KIDNEY LENGTH: 8.73 CM
RIGHT RENAL ARCUATES LOWER POLE EDV: 7 CM/S
RIGHT RENAL ARCUATES LOWER POLE PSV: 19.3 CM/S
RIGHT RENAL ARCUATES LOWER POLE RI: 0.64
RIGHT RENAL ARCUATES UPPER POLE EDV: 8.1 CM/S
RIGHT RENAL ARCUATES UPPER POLE PSV: 15.7 CM/S
RIGHT RENAL ARCUATES UPPER POLE RI: 0.48
RIGHT RENAL DIST DIAS: 32.7 CM/S
RIGHT RENAL DIST RAR: 1.26
RIGHT RENAL DIST SYS: 119.9 CM/S
RIGHT RENAL HILAR EDV: 20 CM/S
RIGHT RENAL HILAR PSV: 53.8 CM/S
RIGHT RENAL MID DIAS: 54 CM/S
RIGHT RENAL MID RAR: 1.88
RIGHT RENAL MID SYS: 179.1 CM/S
RIGHT RENAL ORIGIN DIAS: 70.2 CM/S
RIGHT RENAL ORIGIN RAR: 2.18
RIGHT RENAL ORIGIN SYS: 207.5 CM/S
RIGHT RENAL PROX DIAS: 36.8 CM/S
RIGHT RENAL PROX RAR: 1.4
RIGHT RENAL PROX SYS: 133.7 CM/S
RIGHT RENAL SEGMENTAL LOWER POLE EDV: 14.5 CM/S
RIGHT RENAL SEGMENTAL LOWER POLE PSV: 43.3 CM/S
RIGHT RENAL SEGMENTAL LOWER POLE RI: 0.67
RIGHT RENAL SEGMENTAL UPPER POLE EDV: 10.8 CM/S
RIGHT RENAL SEGMENTAL UPPER POLE PSV: 31.6 CM/S
RIGHT RENAL SEGMENTAL UPPER POLE RI: 0.66
RT AVERAGE RIGHT KIDNEY LENGTH: 9.08 CM
RT RENAL A DIST RI: 0.73
RT RENAL A MID RI: 0.7
RT RENAL A ORIGIN RI: 0.66
RT RENAL A PROX RI: 0.72
RT RIGHT KIDNEY LENGTH 2: 9.25 CM
RT RIGHT KIDNEY LENGTH 3: 9.26 CM
SODIUM SERPL-SCNC: 136 MEQ/L (ref 136–144)
SP GR UR REFRACT.AUTO: 1.01
SQUAMOUS URNS QL MICRO: ABNORMAL /HPF
TR MAX PG: 15 MMHG
TRICUSPID VALVE PEAK REGURGITATION VELOCITY: 1.92 M/S
TSH SERPL DL<=0.05 MIU/L-ACNC: 3.22 MIU/L (ref 0.34–5.6)
UROBILINOGEN UR STRIP-ACNC: 0.2 EU/DL
WBC #/AREA URNS HPF: ABNORMAL /HPF
Z-SCORE OF LEFT VENTRICULAR DIMENSION IN END DIASTOLE: -1.03
Z-SCORE OF LEFT VENTRICULAR DIMENSION IN END SYSTOLE: 0.61
Z-SCORE OF LEFT VENTRICULAR POSTERIOR WALL IN END DIASTOLE: 2.46

## 2019-02-01 PROCEDURE — 63700000 HC SELF-ADMINISTRABLE DRUG: Performed by: HOSPITALIST

## 2019-02-01 PROCEDURE — 99238 HOSP IP/OBS DSCHRG MGMT 30/<: CPT | Performed by: HOSPITALIST

## 2019-02-01 PROCEDURE — 63700000 HC SELF-ADMINISTRABLE DRUG: Performed by: INTERNAL MEDICINE

## 2019-02-01 PROCEDURE — 76775 US EXAM ABDO BACK WALL LIM: CPT

## 2019-02-01 PROCEDURE — 80048 BASIC METABOLIC PNL TOTAL CA: CPT | Performed by: HOSPITALIST

## 2019-02-01 PROCEDURE — 81001 URINALYSIS AUTO W/SCOPE: CPT | Performed by: HOSPITALIST

## 2019-02-01 PROCEDURE — 83735 ASSAY OF MAGNESIUM: CPT | Performed by: HOSPITALIST

## 2019-02-01 PROCEDURE — 36415 COLL VENOUS BLD VENIPUNCTURE: CPT | Performed by: HOSPITALIST

## 2019-02-01 PROCEDURE — 84443 ASSAY THYROID STIM HORMONE: CPT | Performed by: HOSPITALIST

## 2019-02-01 PROCEDURE — 93975 VASCULAR STUDY: CPT

## 2019-02-01 PROCEDURE — 93306 TTE W/DOPPLER COMPLETE: CPT

## 2019-02-01 RX ORDER — POTASSIUM CHLORIDE 750 MG/1
40 TABLET, FILM COATED, EXTENDED RELEASE ORAL ONCE
Status: COMPLETED | OUTPATIENT
Start: 2019-02-01 | End: 2019-02-01

## 2019-02-01 RX ORDER — LOSARTAN POTASSIUM AND HYDROCHLOROTHIAZIDE 12.5; 5 MG/1; MG/1
1 TABLET ORAL EVERY MORNING
Qty: 30 TABLET | Refills: 0
Start: 2019-02-01 | End: 2019-06-07

## 2019-02-01 RX ORDER — LOSARTAN POTASSIUM AND HYDROCHLOROTHIAZIDE 12.5; 5 MG/1; MG/1
1 TABLET ORAL EVERY MORNING
Qty: 30 TABLET | Refills: 0
Start: 2019-02-01 | End: 2019-02-01

## 2019-02-01 RX ADMIN — ASPIRIN 81 MG: 81 TABLET, COATED ORAL at 07:57

## 2019-02-01 RX ADMIN — LOSARTAN POTASSIUM 50 MG: 50 TABLET, FILM COATED ORAL at 07:57

## 2019-02-01 RX ADMIN — PREGABALIN 50 MG: 50 CAPSULE ORAL at 12:07

## 2019-02-01 RX ADMIN — HYDROCHLOROTHIAZIDE 12.5 MG: 12.5 TABLET ORAL at 07:57

## 2019-02-01 RX ADMIN — LEVOTHYROXINE SODIUM 100 MCG: 100 TABLET ORAL at 05:35

## 2019-02-01 RX ADMIN — LORAZEPAM 0.5 MG: 0.5 TABLET ORAL at 07:56

## 2019-02-01 RX ADMIN — PREGABALIN 50 MG: 50 CAPSULE ORAL at 05:36

## 2019-02-01 RX ADMIN — POTASSIUM CHLORIDE 40 MEQ: 750 TABLET, EXTENDED RELEASE ORAL at 07:57

## 2019-02-01 ASSESSMENT — COGNITIVE AND FUNCTIONAL STATUS - GENERAL
HELP NEEDED FOR BATHING: 4 - NONE
MOVING TO AND FROM BED TO CHAIR: 4 - NONE
WALKING IN HOSPITAL ROOM: 4 - NONE
EATING MEALS: 4 - NONE
DRESSING REGULAR UPPER BODY CLOTHING: 4 - NONE
CLIMB 3 TO 5 STEPS WITH RAILING: 3 - A LITTLE
DRESSING REGULAR LOWER BODY CLOTHING: 4 - NONE
STANDING UP FROM CHAIR USING ARMS: 4 - NONE
HELP NEEDED FOR PERSONAL GROOMING: 4 - NONE
TOILETING: 4 - NONE

## 2019-02-01 NOTE — PROGRESS NOTES
Daily Progress Note (LOS: 1)    Interval History:   Patient feeling OK today.  Denies recurrent lightheadedness.  No arrhythmias on telemetry.  Renal US and echocardiogram pending.     Current Medications:    aspirin 81 mg oral Daily   enoxaparin 40 mg subcutaneous Daily (6p)   hydrochlorothiazide 12.5 mg oral Daily   levothyroxine 100 mcg oral Daily (6:30a)   LORazepam 0.5 mg oral Daily   losartan 50 mg oral Daily   metoprolol succinate XL 12.5 mg oral Nightly   pregabalin 50 mg oral BID   pregabalin 75 mg oral Daily (9p)       Physical Exam:  /65 (BP Location: Right upper arm, Patient Position: Lying)   Pulse 88   Temp 36.8 °C (98.2 °F) (Oral)   Resp 18   Ht 1.524 m (5')   Wt 55.5 kg (122 lb 5.7 oz)   SpO2 98%   BMI 23.90 kg/m²     Gen: NAD  HEENT: no JVD  Heart: RRR, nl S1, S2, no m/r/g  Lungs: Clear  Abd: soft, nt, nd  Ext: no edema    I&Os:  No intake or output data in the 24 hours ending 02/01/19 0841    Weights:   Wt Readings from Last 3 Encounters:   01/30/19 55.5 kg (122 lb 5.7 oz)   04/24/18 54.4 kg (120 lb)       Labs:    Results from last 7 days  Lab Units 02/01/19  0323 01/31/19  0450 01/30/19  1213   SODIUM mEQ/L 136 138 132*   POTASSIUM mEQ/L 3.4* 4.2 3.5*   CHLORIDE mEQ/L 106 110* 97*   CO2 mEQ/L 22 21* 23   BUN mg/dL 17 17 18   CREATININE mg/dL 0.6 0.6 0.8   GLUCOSE mg/dL 92 86 96   CALCIUM mg/dL 9.2 9.2 9.9   MAGNESIUM mg/dL 2.1  --  2.1       Results from last 7 days  Lab Units 01/30/19  1214   TROPONIN I ng/mL <0.02           Results from last 7 days  Lab Units 01/31/19  0450 01/30/19  1213   WBC K/uL 5.03 4.68   HEMOGLOBIN g/dL 12.7 13.5   HEMATOCRIT % 37.2 38.3   PLATELETS K/uL 220 224     67 y/o woman with PMHx HTN, hypothyroidism, fibromuscular dysplasia s/p prior renal angioplasty, seizure d/o, depression/anxiety presents with presyncope.     #Presyncope  -EKG benign  -No arrhythmias on telemetry  -Will check echo today  -Neurology following.  No clear etiology yet identified.   MRI brain normal.      #HTN  -BP intermittently elevated  -Cont. Losartan/hctz 50/12.5mg daily.  Patient refused higher dose of medication.   -check renal US given history of renal artery stenosis

## 2019-02-01 NOTE — PATIENT CARE CONFERENCE
Care Progression Rounds Note  Date: 2/1/2019  Time: 10:05 AM     Patient Name: Randi Saul     Medical Record Number: [id number]   YOB: 1950  Sex: Female      Room/Bed: 3027    Admitting Diagnosis: Lightheadedness [R42]  Hyponatremia [E87.1]  Lightheaded [R42]  Lightheadedness [R42]   Admit Date/Time: 1/30/2019 11:44 AM    Primary Diagnosis: No Principal Problem: There is no principal problem currently on the Problem List. Please update the Problem List and refresh.  Principal Problem: No Principal Problem: There is no principal problem currently on the Problem List. Please update the Problem List and refresh.    GMLOS: pending  Anticipated Discharge Date: 2/2/2019    AM-PAC  Mobility Score: 23    Discharge Planning:  Concerns To Be Addressed: no discharge needs identified    Barriers to Discharge:  Barriers to Discharge: Test pending  Comment: Renal U/S, echo    Participants:  , nursing, physical therapy, social work/services

## 2019-02-01 NOTE — DISCHARGE SUMMARY
"   Hospital Medicine Service -  Inpatient Discharge Summary        BRIEF OVERVIEW   Admission Date: 1/30/2019  Discharge Date: 2/1/2019    Admitting Provider: Roasnna Adame MD  Attending Provider: No att. providers found Attending phys phone: N/A    PCP: Shailesh Badillo [phone]    Consults During Admission:  Consult Notes 1/2/2019 to 2/1/2019     Date of Service Author Author Type Status Note Type File Time    01/31/19 1402 Beto Matos MD Physician Signed Consults 01/31/19 1420    01/31/19 1007 Santo Perry DO Physician Signed Consults 01/31/19 1022         DISCHARGE DIAGNOSES      Primary Discharge Diagnosis    Episodes of lightheadedness with associated with pallor and \"inability to talk \"    Secondary Discharge Diagnoses  Active Hospital Problems    Diagnosis Date Noted   • Epilepsy (CMS/HCC) (HCC) 01/31/2019   • Essential hypertension 01/30/2019   • Fibromuscular dysplasia of renal artery, status post stent in 2013 01/31/2019   •  Hypothyroidism 01/31/2019   •  History of diverticulitis requiring colostomy with reversal  History of hernia repair  Hypokalemia 01/30/2019      Resolved Hospital Problems    Diagnosis Date Noted Date Resolved   No resolved problems to display.     SUMMARY OF HOSPITALIZATION      Presenting Problem/History of Present Illness  68 y.o. year-old female with past medical history of hypertension, seizure disorder, renal stent, hypothyroidism who presented to [facility] ER on 1/30/2019 with several episodes of dizziness.  Please see history and physical for complete details on presentation.     Hospital Course      1.  \"Lightheadedness episodes.\" The patient reported that since last Saturday she had several episodes of lightheadedness associated with inability to talk and pallor as per her .  The episodes were short-lived and also associated with headache.  They also occurred when patient was bending forward.  These episodes were different from patient's " typical seizures with staring episodes, lip smacking, and right hand automatisms.  Neither the patient nor her  believe it was seizures.  The patient was evaluated by neurology, Dr. Perry.  He   suggested that if the patient has additional episodes that not related to elevated blood pressure spikes he would suggested admission to [place] epilepsy unit for continuous EEG monitoring.  The patient has not had any further episodes.  She actually feels good today and reports she is back to normal.  2.  Hypertension.  Reportedly the patient had very high blood pressure last Monday, and her Hyzaar was doubled.  The patient did have mild hypokalemia hyponatremia on presentation.  She refused to take higher dose of Hyzaar.  Blood pressure has been reasonably controlled on her regular dose.  Echocardiogram was normal.  The patient was evaluated by cardiology.  There was no arrhythmia noticed on monitor.  3.  History of fibromuscular dysplasia.  Renal ultrasound was normal.  Renal arteries ultrasound revealed patent renal arteries bilaterally. There were elevated velocities at the proximal renal arteries bilaterally suspicious for stenosis. This did not rise to the level of hemodynamic significance based on the renal artery ratio which was less than 3.5.  The patient might need outpatient CTA or MRA.    Vital signs within normal limits.  Blood work is notable for .  The patient is discharged to .    Exam on Day of Discharge  Physical Exam   Constitutional: She is oriented to person, place, and time. She appears well-developed and well-nourished.   Eyes: No scleral icterus.   Neck: Neck supple. No JVD present.   Cardiovascular: Normal rate, regular rhythm and normal heart sounds.    Pulmonary/Chest: Effort normal and breath sounds normal.   Abdominal: Soft. Bowel sounds are normal. She exhibits no mass. There is no tenderness.   Musculoskeletal: She exhibits no edema.   Neurological: She is alert and oriented to  person, place, and time.   Psychiatric: She has a normal mood and affect.   Vitals reviewed.    DISCHARGE MEDICATIONS      Medication List      CHANGE how you take these medications    losartan-hydrochlorothiazide 50-12.5 mg per tablet  Commonly known as:  HYZAAR  Take 1 tablet by mouth every morning.  What changed:  · how much to take  · additional instructions        CONTINUE taking these medications    ADULT ONE DAILY GUMMIES ORAL  Take 1 Dose by mouth every morning.     levothyroxine 100 mcg tablet  Commonly known as:  SYNTHROID  Take 100 mcg by mouth daily.     LORazepam 0.5 mg tablet  Commonly known as:  ATIVAN  Take 0.5 mg by mouth 2 (two) times a day as needed for anxiety.     meclizine 25 mg tablet  Commonly known as:  ANTIVERT  Take 25 mg by mouth 2 (two) times a day as needed for dizziness.     metoprolol succinate XL 25 mg 24 hr tablet  Commonly known as:  TOPROL-XL  Take 12.5 mg by mouth nightly.     * pregabalin 50 mg capsule  Commonly known as:  LYRICA  Take 50 mg by mouth 2 (two) times a day. In addition to 75 mg Lyrica each evening.     * pregabalin 75 mg capsule  Commonly known as:  LYRICA  Take 75 mg by mouth every evening. In addition to Lyrica 50 mg every morning and afternoon        * This list has 2 medication(s) that are the same as other medications prescribed for you. Read the directions carefully, and ask your doctor or other care provider to review them with you.               Randi Saul   Home Medication Instructions BRITTANI:[id number]    Printed on:02/01/19 150   Medication Information                      levothyroxine (SYNTHROID) 100 mcg tablet  Take 100 mcg by mouth daily.             LORazepam (ATIVAN) 0.5 mg tablet  Take 0.5 mg by mouth 2 (two) times a day as needed for anxiety.             losartan-hydrochlorothiazide (HYZAAR) 50-12.5 mg per tablet  Take 1 tablet by mouth every morning.             meclizine (ANTIVERT) 25 mg tablet  Take 25 mg by mouth 2 (two) times a day as  needed for dizziness.             metoprolol succinate XL (TOPROL-XL) 25 mg 24 hr tablet  Take 12.5 mg by mouth nightly.               multivit-minerals/folic acid (ADULT ONE DAILY GUMMIES ORAL)  Take 1 Dose by mouth every morning.             pregabalin (LYRICA) 50 mg capsule  Take 50 mg by mouth 2 (two) times a day. In addition to 75 mg Lyrica each evening.             pregabalin (LYRICA) 75 mg capsule  Take 75 mg by mouth every evening. In addition to Lyrica 50 mg every morning and afternoon                    PROCEDURES / LABS / IMAGING      Operative Procedures        Pertinent Imaging  Ultrasound Kidneys    Result Date: 2/1/2019  IMPRESSION: Normal ultrasound of bilateral kidneys.      OUTPATIENT  FOLLOW-UP / REFERRALS / PENDING TESTS      Outpatient Follow-Up Appointments            In 2 weeks Jeff Bowen MD [facility]        Family doctor next week  Recheck BMP in 1 week    Test Results Pending at Discharge  Unresulted Labs     None          Important Issues to Address in Follow-Up      DISCHARGE DISPOSITION      Disposition: Home     Code Status At Discharge: Prior    Physician Order for Life-Sustaining Treatment Document Status      No documents found

## 2019-02-01 NOTE — PROGRESS NOTES
"   Hospital Medicine Service -  Daily Progress Note       SUBJECTIVE   Late entry  Feels actually better today but still not at baseline  No \"dizziness episodes\"     OBJECTIVE      Vital signs in last 24 hours:  Temp:  [36.4 °C (97.6 °F)-36.9 °C (98.4 °F)] 36.4 °C (97.6 °F)  Heart Rate:  [61-86] 78  Resp:  [16-20] 16  BP: (133-174)/(66-87) 147/71    Intake/Output Summary (Last 24 hours) at 01/31/19 2471  Last data filed at 01/31/19 0641   Gross per 24 hour   Intake              480 ml   Output                0 ml   Net              480 ml       PHYSICAL EXAMINATION      Physical Exam   Constitutional: She is oriented to person, place, and time. She appears well-developed and well-nourished.   Eyes: No scleral icterus.   Neck: Neck supple. No JVD present.   Cardiovascular: Normal rate, regular rhythm and normal heart sounds.    Pulmonary/Chest: Effort normal and breath sounds normal.   Abdominal: Soft. Bowel sounds are normal. She exhibits no mass. There is no tenderness.   Musculoskeletal: She exhibits no edema.   Neurological: She is alert and oriented to person, place, and time.   Psychiatric: She has a normal mood and affect.   Vitals reviewed.     LABS / IMAGING / TELE      Labs  I have reviewed the patient's labs.     Imaging      ECG/Telemetry  Reviewed by me: no arrhythmia    ASSESSMENT AND PLAN      Lightheaded   Assessment & Plan    She is having intermittent short episodes of lightheadedness and feels that she is going to pass out.  She is also unable to talk during these episodes.   The patient and her  do not believe they are seizures but rather similar to symptoms the patient had before renal artery stenting  As per epilepsy entry        Epilepsy (CMS/[facility]) ([facility])   Assessment & Plan    Cont Lyrica  Neuro consult is appr: If she has additional episodes in the hospital, and these are not related to elevated blood pressure spikes, I would suggest a transfer to the [place] epilepsy unit for " continuous EEG monitoring, seizure classification, and to adjust her current medications.   To monitor        Essential hypertension   Assessment & Plan    Refused higher dose of Hyzaar  BP is acceptable on regular dose   To monitor        Fibromuscular dysplasia (CMS/HCC) (HCC)   Assessment & Plan    Check renal arteries ultrasound        Anxiety   Assessment & Plan    Cont ativan prn              VTE Assessment: Pad[place]    Code Status: Full Code  Estimated discharge date: 2/2/2019     Rosanna Adame MD  1/31/2019

## 2019-02-14 ENCOUNTER — TELEPHONE (OUTPATIENT)
Dept: COLORECTAL SURGERY | Facility: CLINIC | Age: 69
End: 2019-02-14

## 2019-02-14 NOTE — TELEPHONE ENCOUNTER
Called patient left voicemail, for her to prepare with two fleet enemas before her appointment tomorrow

## 2019-02-15 ENCOUNTER — OFFICE VISIT (OUTPATIENT)
Dept: COLORECTAL SURGERY | Facility: CLINIC | Age: 69
End: 2019-02-15
Attending: COLON & RECTAL SURGERY
Payer: MEDICARE

## 2019-02-15 VITALS
HEIGHT: 60 IN | DIASTOLIC BLOOD PRESSURE: 76 MMHG | BODY MASS INDEX: 23.95 KG/M2 | TEMPERATURE: 97.6 F | SYSTOLIC BLOOD PRESSURE: 132 MMHG | WEIGHT: 122 LBS

## 2019-02-15 DIAGNOSIS — K57.92 DIVERTICULITIS: Primary | ICD-10-CM

## 2019-02-15 PROCEDURE — 99213 OFFICE O/P EST LOW 20 MIN: CPT | Performed by: COLON & RECTAL SURGERY

## 2019-02-15 NOTE — PROGRESS NOTES
Patient ID: Randi Saul                              : 1950  MRN: [id number]                                           Visit Date: 2/15/2019  Encounter Provider: Jeff Bowen  Referring Provider: Veronika Rowan PA C        HPI: Randi Saul is here for interval follow-up.   Patient is status post Alejandro's resection in the context of perforated diverticulitis in .  She subsequently presented to Dr. Bowen for Alejandro's reversal on 10/28/13.    She was last seen in the office approximately 2 years ago (her appointments had been postponed a couple of times)    The patient's last colonoscopy was in 2016.    Patient denies any CP/SOB/N/V/F/C.  Patient denies any Melena or BPR.  No weight loss.  No additional constitutional symptoms. No obstructive symptoms    She has no acute complaints.  If she takes fiber daily and has 1 bowel movement a day.          Medications:   Current Outpatient Prescriptions:   •  levothyroxine (SYNTHROID) 100 mcg tablet, Take 100 mcg by mouth daily., Disp: , Rfl:   •  LORazepam (ATIVAN) 0.5 mg tablet, Take 0.5 mg by mouth 2 (two) times a day as needed for anxiety., Disp: , Rfl:   •  losartan-hydrochlorothiazide (HYZAAR) 50-12.5 mg per tablet, Take 1 tablet by mouth every morning., Disp: 30 tablet, Rfl: 0  •  meclizine (ANTIVERT) 25 mg tablet, Take 25 mg by mouth 2 (two) times a day as needed for dizziness., Disp: , Rfl:   •  metoprolol succinate XL (TOPROL-XL) 25 mg 24 hr tablet, Take 12.5 mg by mouth nightly.  , Disp: , Rfl:   •  multivit-minerals/folic acid (ADULT ONE DAILY GUMMIES ORAL), Take 1 Dose by mouth every morning., Disp: , Rfl:   •  pregabalin (LYRICA) 50 mg capsule, Take 50 mg by mouth 2 (two) times a day. In addition to 75 mg Lyrica each evening., Disp: , Rfl:   •  pregabalin (LYRICA) 75 mg capsule, Take 75 mg by mouth every evening. In addition to Lyrica 50 mg every morning and afternoon, Disp: , Rfl:     Past Medical History:  has a past medical  history of Diverticulitis of colon; Fibromuscular dysplasia (CMS/HCC) (HCC); Hypertension; and Hypothyroidism.  Past Surgical History:  has a past surgical history that includes Colon surgery (10/28/2013); Colon surgery (05/21/2014); and Colon surgery (04/27/2016).  Social History:  reports that she has never smoked. She has never used smokeless tobacco. She reports that she drinks about 4.2 oz of alcohol per week . She reports that she does not use drugs.  Family History:   Family History   Problem Relation Age of Onset   • Heart disease Mother    • Pulmonary fibrosis Father      Allergies: is allergic to azithromycin; levetiracetam; oxycodone; amoxicillin trihydrate; potassium clavulanate; and sulfa (sulfonamide antibiotics).     Review of Systems   Constitutional: Negative for activity change.   HENT: Negative for congestion.    Respiratory: Negative for apnea, chest tightness and wheezing.    Cardiovascular: Negative for chest pain, palpitations and leg swelling.   Gastrointestinal: Negative for nausea.   Endocrine: Negative for polyuria.   Skin: Negative for color change.   Allergic/Immunologic: Negative for immunocompromised state.   Neurological: Negative for dizziness.   Hematological: Negative for adenopathy.   The following have been reviewed and updated as appropriate in this visit:  Problems         Objective:  Vitals: /76   Temp 36.4 °C (97.6 °F)   Ht 1.524 m (5')   Wt 55.3 kg (122 lb)   BMI 23.83 kg/m²   Constitutional: He is oriented to person, place, and time. He appears well-developed and well-nourished.   HENT:   Head: Normocephalic and atraumatic.   Eyes: Conjunctivae are normal.   Neck: Normal range of motion, Supple,.   Cardiovascular: Normal rate.    Pulmonary/Chest: Effort normal.   Musculoskeletal: Normal range of motion. He exhibits no edema.   Neurological: He is alert and oriented to person, place, and time.   Skin: Skin is warm and dry.   Psychiatric: He has a normal mood and  affect.   Nursing note and vitals reviewed.      Abdominal Exam:   Soft, Non-distended, Non-tender  Incision: C/D/I, No evidence of incisional hernia.   Wound is healing well.    Midline SILS incision no evidence of hernia    Rectal Exam: Normal tone no regularity    ASSESSMENT/PLAN:  Patient is a 68-year-old female status post Alejandro's reversal in the context of perforated diverticulitis (2013).    She continues to do very well.  At this time we will schedule her for a colonoscopy within the next year.  We will see her at the time of her procedure.      Diverticulitis  No active problem regularity at this point.  She will get set up for colonoscopy in 6 months time.  We will see her back here at that point.    Additionally she says she is in the hospital recently for hypertension seems her new cardiologist with changed her medicines she had a difficult time with it.  We will see her back in 6 months          MD DEVEN Marina MD, am scribbing services performed by Dr. Jeff Bowen

## 2019-02-15 NOTE — ASSESSMENT & PLAN NOTE
No active problem regularity at this point.  She will get set up for colonoscopy in 6 months time.  We will see her back here at that point.    Additionally she says she is in the hospital recently for hypertension seems her new cardiologist with changed her medicines she had a difficult time with it.  We will see her back in 6 months

## 2019-03-18 ENCOUNTER — TRANSCRIBE ORDERS (OUTPATIENT)
Dept: SCHEDULING | Age: 69
End: 2019-03-18

## 2019-03-18 ENCOUNTER — HOSPITAL ENCOUNTER (OUTPATIENT)
Dept: RADIOLOGY | Facility: HOSPITAL | Age: 69
Discharge: HOME | End: 2019-03-18
Attending: ORTHOPAEDIC SURGERY
Payer: MEDICARE

## 2019-03-18 DIAGNOSIS — M19.011 PRIMARY OSTEOARTHRITIS OF RIGHT SHOULDER: Primary | ICD-10-CM

## 2019-03-18 DIAGNOSIS — M19.011 PRIMARY OSTEOARTHRITIS OF RIGHT SHOULDER: ICD-10-CM

## 2019-03-26 ENCOUNTER — APPOINTMENT (OUTPATIENT)
Dept: PREADMISSION TESTING | Facility: HOSPITAL | Age: 69
End: 2019-03-26
Attending: ORTHOPAEDIC SURGERY
Payer: MEDICARE

## 2019-03-26 ENCOUNTER — TRANSCRIBE ORDERS (OUTPATIENT)
Dept: CARDIOLOGY | Facility: HOSPITAL | Age: 69
End: 2019-03-26

## 2019-03-26 ENCOUNTER — HOSPITAL ENCOUNTER (OUTPATIENT)
Dept: CARDIOLOGY | Facility: HOSPITAL | Age: 69
Discharge: HOME | End: 2019-03-26
Attending: ORTHOPAEDIC SURGERY
Payer: MEDICARE

## 2019-03-26 VITALS
TEMPERATURE: 97.9 F | HEART RATE: 73 BPM | HEIGHT: 60 IN | DIASTOLIC BLOOD PRESSURE: 72 MMHG | WEIGHT: 124.3 LBS | BODY MASS INDEX: 24.4 KG/M2 | SYSTOLIC BLOOD PRESSURE: 132 MMHG

## 2019-03-26 DIAGNOSIS — Z01.818 ENCOUNTER FOR OTHER PREPROCEDURAL EXAMINATION: ICD-10-CM

## 2019-03-26 DIAGNOSIS — M75.21 BICIPITAL TENDINITIS OF RIGHT SHOULDER: ICD-10-CM

## 2019-03-26 DIAGNOSIS — M75.21 BICIPITAL TENDINITIS OF RIGHT SHOULDER: Primary | ICD-10-CM

## 2019-03-26 PROBLEM — E03.9 HYPOTHYROID: Status: ACTIVE | Noted: 2019-03-26

## 2019-03-26 LAB
ANION GAP SERPL CALC-SCNC: 9 MEQ/L (ref 3–15)
ATRIAL RATE: 66
BUN SERPL-MCNC: 23 MG/DL (ref 8–20)
CALCIUM SERPL-MCNC: 9.4 MG/DL (ref 8.9–10.3)
CHLORIDE SERPL-SCNC: 102 MEQ/L (ref 98–109)
CO2 SERPL-SCNC: 25 MEQ/L (ref 22–32)
CREAT SERPL-MCNC: 0.8 MG/DL
ERYTHROCYTE [DISTWIDTH] IN BLOOD BY AUTOMATED COUNT: 12.3 % (ref 11.7–14.4)
GFR SERPL CREATININE-BSD FRML MDRD: >60 ML/MIN/1.73M*2
GLUCOSE SERPL-MCNC: 92 MG/DL (ref 70–99)
HCT VFR BLDCO AUTO: 36.1 %
HGB BLD-MCNC: 12.2 G/DL
MCH RBC QN AUTO: 30.9 PG (ref 28–33.2)
MCHC RBC AUTO-ENTMCNC: 33.8 G/DL (ref 32.2–35.5)
MCV RBC AUTO: 91.4 FL (ref 83–98)
P AXIS: 70
PDW BLD AUTO: 9.5 FL (ref 9.4–12.3)
PLATELET # BLD AUTO: 239 K/UL
POTASSIUM SERPL-SCNC: 3.8 MEQ/L (ref 3.6–5.1)
PR INTERVAL: 184
QRS DURATION: 80
QT INTERVAL: 428
QTC CALCULATION(BAZETT): 448
R AXIS: 43
RBC # BLD AUTO: 3.95 M/UL (ref 3.93–5.22)
SODIUM SERPL-SCNC: 136 MEQ/L (ref 136–144)
T WAVE AXIS: 47
VENTRICULAR RATE: 66
WBC # BLD AUTO: 5.55 K/UL

## 2019-03-26 PROCEDURE — 36415 COLL VENOUS BLD VENIPUNCTURE: CPT

## 2019-03-26 PROCEDURE — 80048 BASIC METABOLIC PNL TOTAL CA: CPT

## 2019-03-26 PROCEDURE — 99214 OFFICE O/P EST MOD 30 MIN: CPT | Performed by: HOSPITALIST

## 2019-03-26 PROCEDURE — 93005 ELECTROCARDIOGRAM TRACING: CPT

## 2019-03-26 PROCEDURE — 85027 COMPLETE CBC AUTOMATED: CPT

## 2019-03-26 RX ORDER — IBUPROFEN 200 MG
400 TABLET ORAL EVERY 6 HOURS PRN
COMMUNITY
End: 2019-04-11 | Stop reason: HOSPADM

## 2019-03-26 ASSESSMENT — PAIN SCALES - GENERAL: PAINLEVEL: 9

## 2019-03-26 NOTE — CONSULTS
[facility] Medicine Service -  Pre-Operative Consultation       Patient Name: Randi Saul  Referring Surgeon: Dr Lunsford     Reason for Referral: Pre-Operative Evaluation  Surgical Procedure: R total shoulder replacement  Operative Date: 4/10/2019  Other Providers:      PCP: Shailesh Badillo MD     Cardiology: Dr Wayne     HISTORY OF PRESENT ILLNESS      Randi Saul is a 68 y.o. female presenting today to the [facility] for pre-operative evaluation prior to planned surgery.    Pt has OA in both shoulders and had been planning to have L shoulder replaced for over 5 years. Over the last 12 months her right shoulder has become more painful, thought to be related to overuse, and she has come to the point where her pain limits her independence and phsycial functioning (ie bathing, dressing, etc). She has had at least 2 cortisone injections into R shoulder with partial temporary relief.    In regards to medical history:  Partial epilepsy, fibromuscular dysplasia, HTN    The patient denies any current or recent chest pain or pressure, dyspnea, cough, sputum, fevers, chills, abdominal pain, nausea, vomiting, diarrhea or other symptoms. Six weeks ago she was admitted to [facility] with near-syncope, and was treated with electrolytes and increased doses of blood pressure medicines.    Functionally, the patient is able to ascend a flight or so of stairs with no dyspnea or chest pain.     The patient denies, on specific questioning, the following:  No history of MI, arrhythmia,or CHF.  No history of JAKE.  No history of DVT/PE.  No history of COPD.  No history of CVA.  No history of DM.   No history of CKD.     PAST MEDICAL AND SURGICAL HISTORY      Past Medical History:   Diagnosis Date   • Anxiety    • Cataract    • Diverticulitis of colon    • Fibromuscular dysplasia (CMS/HCC) (HCC)    • Glaucoma suspect of both eyes    • History of colostomy reversal     • Hypertension    • Hypothyroidism    • Numbness and tingling of right hand    • Perforated bowel (CMS/HCC) (HCC)    • Umbilical hernia        Past Surgical History:   Procedure Laterality Date   • COLON SURGERY  10/28/2013    Laparoscopic, (single port) resection of sigmoid colostomy, repair hernia, resection portion of sigmoid colon and rectum   • COLON SURGERY  05/21/2014    Colonoscopy to the transverse colon. Poor prep   • COLON SURGERY  04/27/2016    Colonoscopy to base of the cecum   • HERNIA REPAIR     • LAPAROTOMY SALPINGO OOPHORECTOMY Left        MEDICATIONS        Current Outpatient Prescriptions:   •  ibuprofen (MOTRIN) 200 mg tablet, Take 400 mg by mouth every 6 (six) hours as needed for mild pain., Disp: , Rfl:   •  levothyroxine (SYNTHROID) 100 mcg tablet, Take 100 mcg by mouth daily., Disp: , Rfl:   •  LORazepam (ATIVAN) 0.5 mg tablet, Take 0.5 mg by mouth 2 (two) times a day as needed for anxiety., Disp: , Rfl:   •  losartan-hydrochlorothiazide (HYZAAR) 50-12.5 mg per tablet, Take 1 tablet by mouth every morning., Disp: 30 tablet, Rfl: 0  •  meclizine (ANTIVERT) 25 mg tablet, Take 25 mg by mouth 2 (two) times a day as needed for dizziness., Disp: , Rfl:   •  metoprolol succinate XL (TOPROL-XL) 25 mg 24 hr tablet, Take 12.5 mg by mouth nightly.  , Disp: , Rfl:   •  multivit-minerals/folic acid (ADULT ONE DAILY GUMMIES ORAL), Take 1 Dose by mouth every morning., Disp: , Rfl:   •  pregabalin (LYRICA) 50 mg capsule, Take 50 mg by mouth 2 (two) times a day. In addition to 75 mg Lyrica each evening., Disp: , Rfl:   •  pregabalin (LYRICA) 75 mg capsule, Take 75 mg by mouth every evening. In addition to Lyrica 50 mg every morning and afternoon, Disp: , Rfl:     ALLERGIES      Azithromycin; Levetiracetam; Oxycodone; Amoxicillin trihydrate; Potassium clavulanate; and Sulfa (sulfonamide antibiotics)    FAMILY HISTORY      family history includes Heart disease in her mother; Pulmonary fibrosis in her  father.    Denies any prior known family history of DVTs/PEs/clotting disorder    SOCIAL HISTORY      Social History   Substance Use Topics   • Smoking status: Former Smoker     Packs/day: 1.00     Years: 15.00     Types: Cigarettes   • Smokeless tobacco: Never Used      Comment: quit 35 years ago   • Alcohol use 4.2 oz/week     7 Glasses of wine per week      Comment: occas       REVIEW OF SYSTEMS      All other systems reviewed and negative except as noted in HPI    PHYSICAL EXAMINATION      /72   Pulse 73   Temp 36.6 °C (97.9 °F) (Temporal)   Ht 1.524 m (5')   Wt 56.4 kg (124 lb 4.8 oz)   Breastfeeding? No   BMI 24.28 kg/m²   Body mass index is 24.28 kg/m².    Physical Exam   Constitutional: No distress.   HENT:   Head: Normocephalic and atraumatic.   Neck: Neck supple.   Cardiovascular: Normal rate and normal heart sounds.    Pulmonary/Chest: Effort normal. No respiratory distress.   Abdominal: Soft. Bowel sounds are normal. There is no tenderness.   Musculoskeletal: She exhibits no edema.   Neurological: She is alert.   Skin: Skin is warm and dry. No rash noted.   Nursing note and vitals reviewed.      LABS / EKG        Labs  I have reviewed the patient's pertinent labs. Pertinent labs are within normal limits.    Lab Results   Component Value Date     02/01/2019    K 3.4 (L) 02/01/2019     02/01/2019    BUN 17 02/01/2019    CREATININE 0.6 02/01/2019    WBC 5.03 01/31/2019    HGB 12.7 01/31/2019    HCT 37.2 01/31/2019     01/31/2019    ALT 20 01/30/2019    AST 23 01/30/2019         ECG/Telemetry  I have independently reviewed the ECG. No significant findings.    ASSESSMENT AND PLAN         Fibromuscular dysplasia (CMS/HCC) (HCC)  Has caused resistant hypertension  Sees Dr Wayne- cardiology    Epilepsy (CMS/HCC) (HCC)  Diagnosed in 2012- sees Dr Dawkins Neurology  Cont lyrica      Essential hypertension  Cont metoprolol AM of surgery. Hold hyzaar AM of surgery. Can resume losartan  post op as needed for BP control  BP controlled in PAT today  Monitor vitals      Hypothyroid  Cont synthroid    Anxiety  Cont lorazepam prn    Diverticulitis  S/p colostomy for perforated diverticulum several years ago  Had Alejandro's reversal with Dr Bowen and continues to follow up with him       In regards to perioperative cardiac risk:  The patient denies any history of ischemic heart disease, denies any history of CHF, denies any history of CVA, is not on pre-operative treatment with insulin, and does not have a pre-operative creatinine > 2 mg/dL.   The Revised Cardiac Risk Index (RCRI) for this patient indicates 3.9% risk.     Further comments:  Please obtain copy of cardiac clearance letter from upcoming appointment on 4/2/2019. Pending clearance letter she may proceed to OR without additional testing unless otherwise recommended by Dr Wayne.  Holding NSAIDS and other supplements 2 weeks prior to OR  Resume supplements when OK with surgical team.  I would encourage incentive spirometry to assist with minimizing ana-operative pulmonary risk.  DVT prophylaxis and timing of such per the discretion of the surgeon.     Please do not hesitate to contact [facility] during the upcoming hospitalization with any questions or concerns.     Annabel Godrillo, DO  3/26/2019

## 2019-03-26 NOTE — PRE-PROCEDURE INSTRUCTIONS
1. Admissions will call you with your arrival time on 4/9/19 (day prior to surgery) between 2pm - 4pm. For questions about your arrival time, please call [phone].    2. Please report to the [facility] on the day of your procedure. Bring your insurance card and photo ID.    3. Please follow these fasting guidelines:   -Stop food and liquids 8 hours before your arrival time.   -You may continue to drink up to 12 oz of water,but you must stop 2 hours prior to your arrival time.   -There is nothing to drink 2 hours before your arrival time (including gum, mints, candy, and water).    4. Early on the morning of the procedure, please take the following medications listed below with a sip of water, in addition to any medications prescribed by your surgeon: levothyroxine, lyrica, lorazepam      *NO aspirin, ibuprofen, anti-inflammatories, fish oil or Vitamin E unless ordered by physician.    *Stop taking ibuprofen     5. Other instructions: antibacterial shower x 2, brush teeth    6. If you develop a cough, cold, fever, or other symptom prior to the date of the procedure, please report it to your physician immediately.    7. If you need to cancel the procedure for any reason, please contact your physician.    8. Make arrangements to have someone drive you home from the procedure. If you have not arranged for transportation home, your surgery may be cancelled.     9. You may not take public transportation or or a cab unless accompanied by a responsible person.    10. You may not drive a car or operate complex or potentially dangerous machinery for 24 hours following anesthesia and/or sedation.    11. If it is medically necessary for you to have a longer stay, you will be informed as soon as the decision is made.    12. Do not wear or bring anything of value to the hospital, including jewelry of any kind. Do not wear make-up or contact lenses. DO bring your glasses and hearing aid, with a case.    13. Dress in  comfortable clothes.    14. If instructed, please bring a copy of your Advance Directive (Living Will/Durable Power of ) on the day of your procedure.    Pre operative instructions given as per protocol.  Form explained by:

## 2019-03-26 NOTE — ASSESSMENT & PLAN NOTE
S/p colostomy for perforated diverticulum several years ago  Had Alejandro's reversal with Dr Bowen and continues to follow up with him

## 2019-03-26 NOTE — ASSESSMENT & PLAN NOTE
Cont metoprolol AM of surgery. Hold hyzaar AM of surgery. Can resume losartan post op as needed for BP control  BP controlled in PAT today  Monitor vitals

## 2019-04-08 ENCOUNTER — TELEPHONE (OUTPATIENT)
Dept: COLORECTAL SURGERY | Facility: CLINIC | Age: 69
End: 2019-04-08

## 2019-04-08 NOTE — TELEPHONE ENCOUNTER
Spoke with pt's , advised that tylenol 3 for pain should be fine. Advised to continue high fiber diet and plenty of water to avoid constipation. Advised to call office as needed.

## 2019-04-08 NOTE — TELEPHONE ENCOUNTER
PT called office, she questions if it is ok for her to take Tylenol 3 after her upcoming shoulder surgery.

## 2019-04-09 ENCOUNTER — ANESTHESIA EVENT (OUTPATIENT)
Dept: OPERATING ROOM | Facility: HOSPITAL | Age: 69
Setting detail: SURGERY ADMIT
DRG: 483 | End: 2019-04-09
Payer: MEDICARE

## 2019-04-10 ENCOUNTER — ANESTHESIA (OUTPATIENT)
Dept: OPERATING ROOM | Facility: HOSPITAL | Age: 69
Setting detail: SURGERY ADMIT
DRG: 483 | End: 2019-04-10
Payer: MEDICARE

## 2019-04-10 ENCOUNTER — APPOINTMENT (OUTPATIENT)
Dept: RADIOLOGY | Facility: HOSPITAL | Age: 69
Setting detail: SURGERY ADMIT
DRG: 483 | End: 2019-04-10
Attending: STUDENT IN AN ORGANIZED HEALTH CARE EDUCATION/TRAINING PROGRAM
Payer: MEDICARE

## 2019-04-10 ENCOUNTER — HOSPITAL ENCOUNTER (INPATIENT)
Facility: HOSPITAL | Age: 69
LOS: 1 days | Discharge: HOME | DRG: 483 | End: 2019-04-11
Attending: ORTHOPAEDIC SURGERY | Admitting: ORTHOPAEDIC SURGERY
Payer: MEDICARE

## 2019-04-10 PROBLEM — Z96.619 STATUS POST TOTAL SHOULDER ARTHROPLASTY: Status: ACTIVE | Noted: 2019-04-10

## 2019-04-10 PROBLEM — Z96.611 S/P SHOULDER REPLACEMENT, RIGHT: Status: ACTIVE | Noted: 2019-04-10

## 2019-04-10 PROCEDURE — 63600000 HC DRUGS/DETAIL CODE: Performed by: ANESTHESIOLOGY

## 2019-04-10 PROCEDURE — 36000015 HC OR LEVEL 5 EA ADDL MIN: Performed by: ORTHOPAEDIC SURGERY

## 2019-04-10 PROCEDURE — 97161 PT EVAL LOW COMPLEX 20 MIN: CPT | Mod: GP

## 2019-04-10 PROCEDURE — 63600000 HC DRUGS/DETAIL CODE: Performed by: STUDENT IN AN ORGANIZED HEALTH CARE EDUCATION/TRAINING PROGRAM

## 2019-04-10 PROCEDURE — 71000001 HC PACU PHASE 1 INITIAL 30MIN: Performed by: ORTHOPAEDIC SURGERY

## 2019-04-10 PROCEDURE — 37000003 ANESTHESIA PERIPHERAL BLOCK: Performed by: ANESTHESIOLOGY

## 2019-04-10 PROCEDURE — 12000000 HC ROOM AND CARE MED/SURG

## 2019-04-10 PROCEDURE — 73020 X-RAY EXAM OF SHOULDER: CPT | Mod: RT

## 2019-04-10 PROCEDURE — 63700000 HC SELF-ADMINISTRABLE DRUG: Performed by: STUDENT IN AN ORGANIZED HEALTH CARE EDUCATION/TRAINING PROGRAM

## 2019-04-10 PROCEDURE — 71000011 HC PACU PHASE 1 EA ADDL MIN: Performed by: ORTHOPAEDIC SURGERY

## 2019-04-10 PROCEDURE — C1713 ANCHOR/SCREW BN/BN,TIS/BN: HCPCS | Performed by: ORTHOPAEDIC SURGERY

## 2019-04-10 PROCEDURE — 25000000 HC PHARMACY GENERAL: Performed by: NURSE ANESTHETIST, CERTIFIED REGISTERED

## 2019-04-10 PROCEDURE — 25800000 HC PHARMACY IV SOLUTIONS: Performed by: ANESTHESIOLOGY

## 2019-04-10 PROCEDURE — 37000001 HC ANESTHESIA GENERAL: Performed by: ORTHOPAEDIC SURGERY

## 2019-04-10 PROCEDURE — 36000005 HC OR LEVEL 5 INITIAL 30MIN: Performed by: ORTHOPAEDIC SURGERY

## 2019-04-10 PROCEDURE — 99225 PR SBSQ OBSERVATION CARE/DAY 25 MINUTES: CPT | Performed by: HOSPITALIST

## 2019-04-10 PROCEDURE — 63700000 HC SELF-ADMINISTRABLE DRUG: Performed by: NURSE PRACTITIONER

## 2019-04-10 PROCEDURE — 27200000 HC STERILE SUPPLY: Performed by: ORTHOPAEDIC SURGERY

## 2019-04-10 PROCEDURE — 63600000 HC DRUGS/DETAIL CODE: Performed by: NURSE ANESTHETIST, CERTIFIED REGISTERED

## 2019-04-10 PROCEDURE — 0RRJ0JZ REPLACEMENT OF RIGHT SHOULDER JOINT WITH SYNTHETIC SUBSTITUTE, OPEN APPROACH: ICD-10-PCS | Performed by: ORTHOPAEDIC SURGERY

## 2019-04-10 PROCEDURE — 63600000 HC DRUGS/DETAIL CODE

## 2019-04-10 PROCEDURE — C1776 JOINT DEVICE (IMPLANTABLE): HCPCS | Performed by: ORTHOPAEDIC SURGERY

## 2019-04-10 PROCEDURE — 25800000 HC PHARMACY IV SOLUTIONS: Performed by: NURSE ANESTHETIST, CERTIFIED REGISTERED

## 2019-04-10 PROCEDURE — 25800000 HC PHARMACY IV SOLUTIONS: Performed by: NURSE PRACTITIONER

## 2019-04-10 DEVICE — HUMERAL STEM SHORT 10MM: Type: IMPLANTABLE DEVICE | Site: SHOULDER | Status: FUNCTIONAL

## 2019-04-10 DEVICE — GLENOID ALL-POLY PEGGED 42MM: Type: IMPLANTABLE DEVICE | Site: SHOULDER | Status: FUNCTIONAL

## 2019-04-10 DEVICE — IMPLANTABLE DEVICE: Type: IMPLANTABLE DEVICE | Site: SHOULDER | Status: FUNCTIONAL

## 2019-04-10 DEVICE — CEMENT BONE SIMPLEX W/TOBRAMYCIN: Type: IMPLANTABLE DEVICE | Site: SHOULDER | Status: FUNCTIONAL

## 2019-04-10 DEVICE — HUMERAL NECK NEUTRAL: Type: IMPLANTABLE DEVICE | Site: SHOULDER | Status: FUNCTIONAL

## 2019-04-10 RX ORDER — METOPROLOL SUCCINATE 25 MG/1
12.5 TABLET, EXTENDED RELEASE ORAL NIGHTLY
Status: DISCONTINUED | OUTPATIENT
Start: 2019-04-10 | End: 2019-04-11 | Stop reason: HOSPADM

## 2019-04-10 RX ORDER — FENTANYL CITRATE 50 UG/ML
50 INJECTION, SOLUTION INTRAMUSCULAR; INTRAVENOUS
Status: DISCONTINUED | OUTPATIENT
Start: 2019-04-10 | End: 2019-04-10

## 2019-04-10 RX ORDER — ROCURONIUM BROMIDE 10 MG/ML
INJECTION, SOLUTION INTRAVENOUS AS NEEDED
Status: DISCONTINUED | OUTPATIENT
Start: 2019-04-10 | End: 2019-04-10 | Stop reason: SURG

## 2019-04-10 RX ORDER — KETOROLAC TROMETHAMINE 30 MG/ML
INJECTION, SOLUTION INTRAMUSCULAR; INTRAVENOUS AS NEEDED
Status: DISCONTINUED | OUTPATIENT
Start: 2019-04-10 | End: 2019-04-10 | Stop reason: SURG

## 2019-04-10 RX ORDER — EPHEDRINE SULFATE 50 MG/ML
INJECTION, SOLUTION INTRAVENOUS AS NEEDED
Status: DISCONTINUED | OUTPATIENT
Start: 2019-04-10 | End: 2019-04-10 | Stop reason: SURG

## 2019-04-10 RX ORDER — SODIUM CHLORIDE 9 MG/ML
INJECTION, SOLUTION INTRAVENOUS CONTINUOUS
Status: DISCONTINUED | OUTPATIENT
Start: 2019-04-10 | End: 2019-04-10

## 2019-04-10 RX ORDER — LORAZEPAM 0.5 MG/1
0.5 TABLET ORAL 2 TIMES DAILY PRN
Status: DISCONTINUED | OUTPATIENT
Start: 2019-04-10 | End: 2019-04-11 | Stop reason: HOSPADM

## 2019-04-10 RX ORDER — GLYCOPYRROLATE 0.6MG/3ML
SYRINGE (ML) INTRAVENOUS AS NEEDED
Status: DISCONTINUED | OUTPATIENT
Start: 2019-04-10 | End: 2019-04-10 | Stop reason: SURG

## 2019-04-10 RX ORDER — SODIUM CHLORIDE, SODIUM GLUCONATE, SODIUM ACETATE, POTASSIUM CHLORIDE AND MAGNESIUM CHLORIDE 30; 37; 368; 526; 502 MG/100ML; MG/100ML; MG/100ML; MG/100ML; MG/100ML
INJECTION, SOLUTION INTRAVENOUS CONTINUOUS PRN
Status: DISCONTINUED | OUTPATIENT
Start: 2019-04-10 | End: 2019-04-10 | Stop reason: SURG

## 2019-04-10 RX ORDER — PREGABALIN 50 MG/1
50 CAPSULE ORAL 2 TIMES DAILY
Status: DISCONTINUED | OUTPATIENT
Start: 2019-04-10 | End: 2019-04-10

## 2019-04-10 RX ORDER — NAPROXEN SODIUM 220 MG/1
81 TABLET, FILM COATED ORAL DAILY
Status: DISCONTINUED | OUTPATIENT
Start: 2019-04-10 | End: 2019-04-10

## 2019-04-10 RX ORDER — POLYETHYLENE GLYCOL 3350 17 G/17G
17 POWDER, FOR SOLUTION ORAL DAILY
Status: DISCONTINUED | OUTPATIENT
Start: 2019-04-11 | End: 2019-04-11 | Stop reason: HOSPADM

## 2019-04-10 RX ORDER — FAMOTIDINE 20 MG/1
20 TABLET, FILM COATED ORAL DAILY
Status: DISCONTINUED | OUTPATIENT
Start: 2019-04-10 | End: 2019-04-11 | Stop reason: HOSPADM

## 2019-04-10 RX ORDER — NEOSTIGMINE METHYLSULFATE 1 MG/ML
INJECTION INTRAVENOUS AS NEEDED
Status: DISCONTINUED | OUTPATIENT
Start: 2019-04-10 | End: 2019-04-10 | Stop reason: SURG

## 2019-04-10 RX ORDER — LIDOCAINE HYDROCHLORIDE 10 MG/ML
INJECTION, SOLUTION INFILTRATION; PERINEURAL AS NEEDED
Status: DISCONTINUED | OUTPATIENT
Start: 2019-04-10 | End: 2019-04-10 | Stop reason: SURG

## 2019-04-10 RX ORDER — FENTANYL CITRATE 50 UG/ML
INJECTION, SOLUTION INTRAMUSCULAR; INTRAVENOUS AS NEEDED
Status: DISCONTINUED | OUTPATIENT
Start: 2019-04-10 | End: 2019-04-10 | Stop reason: SURG

## 2019-04-10 RX ORDER — PREGABALIN 75 MG/1
75 CAPSULE ORAL EVERY EVENING
Status: DISCONTINUED | OUTPATIENT
Start: 2019-04-10 | End: 2019-04-11 | Stop reason: HOSPADM

## 2019-04-10 RX ORDER — AMOXICILLIN 250 MG
1 CAPSULE ORAL 2 TIMES DAILY
Status: DISCONTINUED | OUTPATIENT
Start: 2019-04-10 | End: 2019-04-11 | Stop reason: HOSPADM

## 2019-04-10 RX ORDER — DEXTROSE 40 %
15-30 GEL (GRAM) ORAL AS NEEDED
Status: DISCONTINUED | OUTPATIENT
Start: 2019-04-10 | End: 2019-04-11 | Stop reason: HOSPADM

## 2019-04-10 RX ORDER — OXYCODONE HYDROCHLORIDE 5 MG/1
5 TABLET ORAL EVERY 6 HOURS PRN
Status: DISCONTINUED | OUTPATIENT
Start: 2019-04-10 | End: 2019-04-10

## 2019-04-10 RX ORDER — ONDANSETRON HYDROCHLORIDE 2 MG/ML
INJECTION, SOLUTION INTRAVENOUS AS NEEDED
Status: DISCONTINUED | OUTPATIENT
Start: 2019-04-10 | End: 2019-04-10 | Stop reason: SURG

## 2019-04-10 RX ORDER — ALUMINUM HYDROXIDE, MAGNESIUM HYDROXIDE, AND SIMETHICONE 1200; 120; 1200 MG/30ML; MG/30ML; MG/30ML
30 SUSPENSION ORAL EVERY 6 HOURS PRN
Status: DISCONTINUED | OUTPATIENT
Start: 2019-04-10 | End: 2019-04-11 | Stop reason: HOSPADM

## 2019-04-10 RX ORDER — LOSARTAN POTASSIUM 50 MG/1
50 TABLET ORAL DAILY
Status: DISCONTINUED | OUTPATIENT
Start: 2019-04-11 | End: 2019-04-11 | Stop reason: HOSPADM

## 2019-04-10 RX ORDER — NAPROXEN SODIUM 220 MG/1
81 TABLET, FILM COATED ORAL DAILY
Qty: 30 TABLET | Refills: 0
Start: 2019-04-11 | End: 2019-07-10 | Stop reason: HOSPADM

## 2019-04-10 RX ORDER — PROPOFOL 10 MG/ML
INJECTION, EMULSION INTRAVENOUS AS NEEDED
Status: DISCONTINUED | OUTPATIENT
Start: 2019-04-10 | End: 2019-04-10 | Stop reason: SURG

## 2019-04-10 RX ORDER — HYDROMORPHONE HYDROCHLORIDE 1 MG/ML
0.5 INJECTION, SOLUTION INTRAMUSCULAR; INTRAVENOUS; SUBCUTANEOUS
Status: DISCONTINUED | OUTPATIENT
Start: 2019-04-10 | End: 2019-04-11 | Stop reason: HOSPADM

## 2019-04-10 RX ORDER — ACETAMINOPHEN 325 MG/1
650 TABLET ORAL EVERY 6 HOURS
Status: DISCONTINUED | OUTPATIENT
Start: 2019-04-11 | End: 2019-04-10

## 2019-04-10 RX ORDER — LEVOTHYROXINE SODIUM 100 UG/1
100 TABLET ORAL
Status: DISCONTINUED | OUTPATIENT
Start: 2019-04-11 | End: 2019-04-11 | Stop reason: HOSPADM

## 2019-04-10 RX ORDER — PHENYLEPHRINE HYDROCHLORIDE 10 MG/ML
INJECTION INTRAVENOUS AS NEEDED
Status: DISCONTINUED | OUTPATIENT
Start: 2019-04-10 | End: 2019-04-10 | Stop reason: SURG

## 2019-04-10 RX ORDER — IBUPROFEN 200 MG
16-32 TABLET ORAL AS NEEDED
Status: DISCONTINUED | OUTPATIENT
Start: 2019-04-10 | End: 2019-04-11 | Stop reason: HOSPADM

## 2019-04-10 RX ORDER — PREGABALIN 50 MG/1
50 CAPSULE ORAL 2 TIMES DAILY
Status: DISCONTINUED | OUTPATIENT
Start: 2019-04-10 | End: 2019-04-11 | Stop reason: HOSPADM

## 2019-04-10 RX ORDER — AMOXICILLIN 250 MG
1 CAPSULE ORAL 2 TIMES DAILY
Qty: 60 TABLET | Refills: 0
Start: 2019-04-10 | End: 2019-07-10 | Stop reason: HOSPADM

## 2019-04-10 RX ORDER — ACETAMINOPHEN 325 MG/1
650 TABLET ORAL EVERY 4 HOURS PRN
Status: DISCONTINUED | OUTPATIENT
Start: 2019-04-10 | End: 2019-04-11 | Stop reason: HOSPADM

## 2019-04-10 RX ORDER — SODIUM CHLORIDE 9 MG/ML
INJECTION, SOLUTION INTRAVENOUS CONTINUOUS
Status: ACTIVE | OUTPATIENT
Start: 2019-04-10 | End: 2019-04-11

## 2019-04-10 RX ORDER — HYDROMORPHONE HYDROCHLORIDE 1 MG/ML
0.5 INJECTION, SOLUTION INTRAMUSCULAR; INTRAVENOUS; SUBCUTANEOUS
Status: DISCONTINUED | OUTPATIENT
Start: 2019-04-10 | End: 2019-04-10

## 2019-04-10 RX ORDER — MECLIZINE HYDROCHLORIDE 25 MG/1
25 TABLET ORAL 2 TIMES DAILY PRN
Status: DISCONTINUED | OUTPATIENT
Start: 2019-04-10 | End: 2019-04-11 | Stop reason: HOSPADM

## 2019-04-10 RX ORDER — OXYCODONE HYDROCHLORIDE 5 MG/1
5-10 TABLET ORAL EVERY 4 HOURS PRN
Status: DISCONTINUED | OUTPATIENT
Start: 2019-04-10 | End: 2019-04-10

## 2019-04-10 RX ORDER — ONDANSETRON HYDROCHLORIDE 2 MG/ML
4 INJECTION, SOLUTION INTRAVENOUS
Status: DISCONTINUED | OUTPATIENT
Start: 2019-04-10 | End: 2019-04-10

## 2019-04-10 RX ORDER — NAPROXEN SODIUM 220 MG/1
81 TABLET, FILM COATED ORAL DAILY
Status: DISCONTINUED | OUTPATIENT
Start: 2019-04-11 | End: 2019-04-11 | Stop reason: HOSPADM

## 2019-04-10 RX ORDER — ACETAMINOPHEN AND CODEINE PHOSPHATE 300; 30 MG/1; MG/1
1-2 TABLET ORAL EVERY 4 HOURS PRN
Status: DISCONTINUED | OUTPATIENT
Start: 2019-04-10 | End: 2019-04-11 | Stop reason: HOSPADM

## 2019-04-10 RX ORDER — DEXTROSE 50 % IN WATER (D50W) INTRAVENOUS SYRINGE
25 AS NEEDED
Status: DISCONTINUED | OUTPATIENT
Start: 2019-04-10 | End: 2019-04-11 | Stop reason: HOSPADM

## 2019-04-10 RX ORDER — MIDAZOLAM HYDROCHLORIDE 2 MG/2ML
INJECTION, SOLUTION INTRAMUSCULAR; INTRAVENOUS AS NEEDED
Status: DISCONTINUED | OUTPATIENT
Start: 2019-04-10 | End: 2019-04-10 | Stop reason: SURG

## 2019-04-10 RX ORDER — OXYCODONE HYDROCHLORIDE 5 MG/1
5-10 TABLET ORAL EVERY 4 HOURS PRN
Refills: 0
Start: 2019-04-10 | End: 2019-04-11 | Stop reason: HOSPADM

## 2019-04-10 RX ORDER — KETOROLAC TROMETHAMINE 15 MG/ML
15 INJECTION, SOLUTION INTRAMUSCULAR; INTRAVENOUS EVERY 6 HOURS
Status: DISCONTINUED | OUTPATIENT
Start: 2019-04-11 | End: 2019-04-11

## 2019-04-10 RX ADMIN — KETOROLAC TROMETHAMINE 15 MG: 30 INJECTION, SOLUTION INTRAMUSCULAR at 11:42

## 2019-04-10 RX ADMIN — FENTANYL CITRATE 100 MCG: 50 INJECTION, SOLUTION INTRAMUSCULAR; INTRAVENOUS at 09:20

## 2019-04-10 RX ADMIN — PHENYLEPHRINE HYDROCHLORIDE 100 MCG: 10 INJECTION INTRAVENOUS at 11:26

## 2019-04-10 RX ADMIN — SODIUM CHLORIDE: 9 INJECTION, SOLUTION INTRAVENOUS at 09:02

## 2019-04-10 RX ADMIN — PREGABALIN 75 MG: 75 CAPSULE ORAL at 21:35

## 2019-04-10 RX ADMIN — PHENYLEPHRINE HYDROCHLORIDE 100 MCG: 10 INJECTION INTRAVENOUS at 11:32

## 2019-04-10 RX ADMIN — FENTANYL CITRATE 50 MCG: 50 INJECTION, SOLUTION INTRAMUSCULAR; INTRAVENOUS at 12:39

## 2019-04-10 RX ADMIN — FAMOTIDINE 20 MG: 20 TABLET ORAL at 17:43

## 2019-04-10 RX ADMIN — KETOROLAC TROMETHAMINE 15 MG: 15 INJECTION, SOLUTION INTRAMUSCULAR; INTRAVENOUS at 23:43

## 2019-04-10 RX ADMIN — EPHEDRINE SULFATE 10 MG: 50 INJECTION INTRAVENOUS at 10:31

## 2019-04-10 RX ADMIN — ACETAMINOPHEN AND CODEINE PHOSPHATE 1 TABLET: 300; 30 TABLET ORAL at 21:35

## 2019-04-10 RX ADMIN — PHENYLEPHRINE HYDROCHLORIDE 100 MCG: 10 INJECTION INTRAVENOUS at 10:41

## 2019-04-10 RX ADMIN — SENNOSIDES AND DOCUSATE SODIUM 1 TABLET: 8.6; 5 TABLET ORAL at 21:36

## 2019-04-10 RX ADMIN — PROPOFOL 150 MG: 10 INJECTION, EMULSION INTRAVENOUS at 09:50

## 2019-04-10 RX ADMIN — PHENYLEPHRINE HYDROCHLORIDE 100 MCG: 10 INJECTION INTRAVENOUS at 10:18

## 2019-04-10 RX ADMIN — PHENYLEPHRINE HYDROCHLORIDE 200 MCG: 10 INJECTION INTRAVENOUS at 11:02

## 2019-04-10 RX ADMIN — PHENYLEPHRINE HYDROCHLORIDE 50 MCG: 10 INJECTION INTRAVENOUS at 10:07

## 2019-04-10 RX ADMIN — PHENYLEPHRINE HYDROCHLORIDE 200 MCG: 10 INJECTION INTRAVENOUS at 10:06

## 2019-04-10 RX ADMIN — Medication 0.5 MG: at 11:41

## 2019-04-10 RX ADMIN — LIDOCAINE HYDROCHLORIDE 5 ML: 10 INJECTION, SOLUTION INFILTRATION; PERINEURAL at 09:50

## 2019-04-10 RX ADMIN — METOPROLOL SUCCINATE 12.5 MG: 25 TABLET, EXTENDED RELEASE ORAL at 21:36

## 2019-04-10 RX ADMIN — PHENYLEPHRINE HYDROCHLORIDE 200 MCG: 10 INJECTION INTRAVENOUS at 10:30

## 2019-04-10 RX ADMIN — ROCURONIUM BROMIDE 40 MG: 10 INJECTION, SOLUTION INTRAVENOUS at 09:50

## 2019-04-10 RX ADMIN — PHENYLEPHRINE HYDROCHLORIDE 100 MCG: 10 INJECTION INTRAVENOUS at 10:59

## 2019-04-10 RX ADMIN — EPHEDRINE SULFATE 10 MG: 50 INJECTION INTRAVENOUS at 10:06

## 2019-04-10 RX ADMIN — VANCOMYCIN HYDROCHLORIDE 1 G: 1 INJECTION, POWDER, LYOPHILIZED, FOR SOLUTION INTRAVENOUS at 21:37

## 2019-04-10 RX ADMIN — HYDROMORPHONE HYDROCHLORIDE 0.5 MG: 1 INJECTION, SOLUTION INTRAMUSCULAR; INTRAVENOUS; SUBCUTANEOUS at 23:43

## 2019-04-10 RX ADMIN — MIDAZOLAM HYDROCHLORIDE 2 MG: 1 INJECTION, SOLUTION INTRAMUSCULAR; INTRAVENOUS at 09:20

## 2019-04-10 RX ADMIN — SODIUM CHLORIDE: 9 INJECTION, SOLUTION INTRAVENOUS at 08:59

## 2019-04-10 RX ADMIN — PHENYLEPHRINE HYDROCHLORIDE 100 MCG: 10 INJECTION INTRAVENOUS at 10:54

## 2019-04-10 RX ADMIN — PHENYLEPHRINE HYDROCHLORIDE 100 MCG: 10 INJECTION INTRAVENOUS at 10:32

## 2019-04-10 RX ADMIN — SODIUM CHLORIDE: 9 INJECTION, SOLUTION INTRAVENOUS at 14:25

## 2019-04-10 RX ADMIN — ONDANSETRON 4 MG: 2 INJECTION INTRAMUSCULAR; INTRAVENOUS at 11:03

## 2019-04-10 RX ADMIN — NEOSTIGMINE METHYLSULFATE 3 MG: 1 INJECTION INTRAVENOUS at 11:41

## 2019-04-10 RX ADMIN — VANCOMYCIN HYDROCHLORIDE 1000 MG: 1 INJECTION, POWDER, LYOPHILIZED, FOR SOLUTION INTRAVENOUS at 09:15

## 2019-04-10 RX ADMIN — SODIUM CHLORIDE, SODIUM GLUCONATE, SODIUM ACETATE, POTASSIUM CHLORIDE AND MAGNESIUM CHLORIDE: 526; 502; 368; 37; 30 INJECTION, SOLUTION INTRAVENOUS at 11:01

## 2019-04-10 RX ADMIN — PREGABALIN 50 MG: 50 CAPSULE ORAL at 14:19

## 2019-04-10 RX ADMIN — PHENYLEPHRINE HYDROCHLORIDE 100 MCG: 10 INJECTION INTRAVENOUS at 11:13

## 2019-04-10 ASSESSMENT — COGNITIVE AND FUNCTIONAL STATUS - GENERAL
DRESSING REGULAR LOWER BODY CLOTHING: 3 - A LITTLE
MOVING TO AND FROM BED TO CHAIR: 3 - A LITTLE
CLIMB 3 TO 5 STEPS WITH RAILING: 4 - NONE
DRESSING REGULAR UPPER BODY CLOTHING: 3 - A LITTLE
HELP NEEDED FOR BATHING: 3 - A LITTLE
WALKING IN HOSPITAL ROOM: 4 - NONE
MOVING TO AND FROM BED TO CHAIR: 3 - A LITTLE
STANDING UP FROM CHAIR USING ARMS: 4 - NONE
TOILETING: 3 - A LITTLE
EATING MEALS: 4 - NONE
WALKING IN HOSPITAL ROOM: 3 - A LITTLE
STANDING UP FROM CHAIR USING ARMS: 3 - A LITTLE
HELP NEEDED FOR PERSONAL GROOMING: 3 - A LITTLE
CLIMB 3 TO 5 STEPS WITH RAILING: 3 - A LITTLE

## 2019-04-10 NOTE — ASSESSMENT & PLAN NOTE
History of fibromuscular dysplasia 2/2 resistant htn. Follows o/p with cardiology, Dr. Wayne  Noted ho CRHISTELLE s/p PTA for FD. Renal US noted patency 2/1 by Rosana.   Was cleared by cardio for procedure.

## 2019-04-10 NOTE — H&P (VIEW-ONLY)
[facility] Medicine Service -  Pre-Operative Consultation       Patient Name: Randi Saul  Referring Surgeon: Dr Lunsford     Reason for Referral: Pre-Operative Evaluation  Surgical Procedure: R total shoulder replacement  Operative Date: 4/10/2019  Other Providers:      PCP: Shailesh Badillo MD     Cardiology: Dr Wayne     HISTORY OF PRESENT ILLNESS      Randi Saul is a 68 y.o. female presenting today to the [facility] at [facility] for pre-operative evaluation prior to planned surgery.    Pt has OA in both shoulders and had been planning to have L shoulder replaced for over 5 years. Over the last 12 months her right shoulder has become more painful, thought to be related to overuse, and she has come to the point where her pain limits her independence and phsycial functioning (ie bathing, dressing, etc). She has had at least 2 cortisone injections into R shoulder with partial temporary relief.    In regards to medical history:  Partial epilepsy, fibromuscular dysplasia, HTN    The patient denies any current or recent chest pain or pressure, dyspnea, cough, sputum, fevers, chills, abdominal pain, nausea, vomiting, diarrhea or other symptoms. Six weeks ago she was admitted to [facility] with near-syncope, and was treated with electrolytes and increased doses of blood pressure medicines.    Functionally, the patient is able to ascend a flight or so of stairs with no dyspnea or chest pain.     The patient denies, on specific questioning, the following:  No history of MI, arrhythmia,or CHF.  No history of JAKE.  No history of DVT/PE.  No history of COPD.  No history of CVA.  No history of DM.   No history of CKD.     PAST MEDICAL AND SURGICAL HISTORY      Past Medical History:   Diagnosis Date   • Anxiety    • Cataract    • Diverticulitis of colon    • Fibromuscular dysplasia (CMS/HCC) (HCC)    • Glaucoma suspect of both eyes    • History of colostomy reversal     • Hypertension    • Hypothyroidism    • Numbness and tingling of right hand    • Perforated bowel (CMS/HCC) (HCC)    • Umbilical hernia        Past Surgical History:   Procedure Laterality Date   • COLON SURGERY  10/28/2013    Laparoscopic, (single port) resection of sigmoid colostomy, repair hernia, resection portion of sigmoid colon and rectum   • COLON SURGERY  05/21/2014    Colonoscopy to the transverse colon. Poor prep   • COLON SURGERY  04/27/2016    Colonoscopy to base of the cecum   • HERNIA REPAIR     • LAPAROTOMY SALPINGO OOPHORECTOMY Left        MEDICATIONS        Current Outpatient Prescriptions:   •  ibuprofen (MOTRIN) 200 mg tablet, Take 400 mg by mouth every 6 (six) hours as needed for mild pain., Disp: , Rfl:   •  levothyroxine (SYNTHROID) 100 mcg tablet, Take 100 mcg by mouth daily., Disp: , Rfl:   •  LORazepam (ATIVAN) 0.5 mg tablet, Take 0.5 mg by mouth 2 (two) times a day as needed for anxiety., Disp: , Rfl:   •  losartan-hydrochlorothiazide (HYZAAR) 50-12.5 mg per tablet, Take 1 tablet by mouth every morning., Disp: 30 tablet, Rfl: 0  •  meclizine (ANTIVERT) 25 mg tablet, Take 25 mg by mouth 2 (two) times a day as needed for dizziness., Disp: , Rfl:   •  metoprolol succinate XL (TOPROL-XL) 25 mg 24 hr tablet, Take 12.5 mg by mouth nightly.  , Disp: , Rfl:   •  multivit-minerals/folic acid (ADULT ONE DAILY GUMMIES ORAL), Take 1 Dose by mouth every morning., Disp: , Rfl:   •  pregabalin (LYRICA) 50 mg capsule, Take 50 mg by mouth 2 (two) times a day. In addition to 75 mg Lyrica each evening., Disp: , Rfl:   •  pregabalin (LYRICA) 75 mg capsule, Take 75 mg by mouth every evening. In addition to Lyrica 50 mg every morning and afternoon, Disp: , Rfl:     ALLERGIES      Azithromycin; Levetiracetam; Oxycodone; Amoxicillin trihydrate; Potassium clavulanate; and Sulfa (sulfonamide antibiotics)    FAMILY HISTORY      family history includes Heart disease in her mother; Pulmonary fibrosis in her  father.    Denies any prior known family history of DVTs/PEs/clotting disorder    SOCIAL HISTORY      Social History   Substance Use Topics   • Smoking status: Former Smoker     Packs/day: 1.00     Years: 15.00     Types: Cigarettes   • Smokeless tobacco: Never Used      Comment: quit 35 years ago   • Alcohol use 4.2 oz/week     7 Glasses of wine per week      Comment: occas       REVIEW OF SYSTEMS      All other systems reviewed and negative except as noted in HPI    PHYSICAL EXAMINATION      /72   Pulse 73   Temp 36.6 °C (97.9 °F) (Temporal)   Ht 1.524 m (5')   Wt 56.4 kg (124 lb 4.8 oz)   Breastfeeding? No   BMI 24.28 kg/m²   Body mass index is 24.28 kg/m².    Physical Exam   Constitutional: No distress.   HENT:   Head: Normocephalic and atraumatic.   Neck: Neck supple.   Cardiovascular: Normal rate and normal heart sounds.    Pulmonary/Chest: Effort normal. No respiratory distress.   Abdominal: Soft. Bowel sounds are normal. There is no tenderness.   Musculoskeletal: She exhibits no edema.   Neurological: She is alert.   Skin: Skin is warm and dry. No rash noted.   Nursing note and vitals reviewed.      LABS / EKG        Labs  I have reviewed the patient's pertinent labs. Pertinent labs are within normal limits.    Lab Results   Component Value Date     02/01/2019    K 3.4 (L) 02/01/2019     02/01/2019    BUN 17 02/01/2019    CREATININE 0.6 02/01/2019    WBC 5.03 01/31/2019    HGB 12.7 01/31/2019    HCT 37.2 01/31/2019     01/31/2019    ALT 20 01/30/2019    AST 23 01/30/2019         ECG/Telemetry  I have independently reviewed the ECG. No significant findings.    ASSESSMENT AND PLAN         Fibromuscular dysplasia (CMS/HCC) (HCC)  Has caused resistant hypertension  Sees Dr Wayne- cardiology    Epilepsy (CMS/HCC) (HCC)  Diagnosed in 2012- sees Dr Dawkins Neurology  Cont lyrica      Essential hypertension  Cont metoprolol AM of surgery. Hold hyzaar AM of surgery. Can resume losartan  post op as needed for BP control  BP controlled in PAT today  Monitor vitals      Hypothyroid  Cont synthroid    Anxiety  Cont lorazepam prn    Diverticulitis  S/p colostomy for perforated diverticulum several years ago  Had Alejandro's reversal with Dr Bowen and continues to follow up with him       In regards to perioperative cardiac risk:  The patient denies any history of ischemic heart disease, denies any history of CHF, denies any history of CVA, is not on pre-operative treatment with insulin, and does not have a pre-operative creatinine > 2 mg/dL.   The Revised Cardiac Risk Index (RCRI) for this patient indicates 3.9% risk.     Further comments:  Please obtain copy of cardiac clearance letter from upcoming appointment on 4/2/2019. Pending clearance letter she may proceed to OR without additional testing unless otherwise recommended by Dr Wayne.  Holding NSAIDS and other supplements 2 weeks prior to OR  Resume supplements when OK with surgical team.  I would encourage incentive spirometry to assist with minimizing ana-operative pulmonary risk.  DVT prophylaxis and timing of such per the discretion of the surgeon.     Please do not hesitate to contact [facility] during the upcoming hospitalization with any questions or concerns.     Annabel Gordillo, DO  3/26/2019

## 2019-04-10 NOTE — HOSPITAL COURSE
Mague is a 68 y.o. female admitted on 4/10/2019 with Status post total shoulder arthroplasty [Z96.619]. Principal problem is S/P shoulder replacement, right.    Mague has a past medical history of Anxiety; Cataract; Diverticulitis of colon; Fibromuscular dysplasia (CMS/HCC) (HCC); Glaucoma suspect of both eyes; History of colostomy reversal; Hypertension; Hypothyroidism; Numbness and tingling of right hand; Perforated bowel (CMS/HCC) (HCC); Seizures (CMS/HCC) (HCC) (04/07/2019); and Umbilical hernia.

## 2019-04-10 NOTE — CONSULTS
Hospital Medicine Service -  Daily Progress Note       SUBJECTIVE   Interval History: Pt seen and examined in PACU and resting comfortably. Denies current complaint of pain. Denies ha, dizziness, cp, shortness of breath, abd pain, n or v or any other symptoms.     OBJECTIVE      Vital signs in last 24 hours:  Temp:  [36.4 °C (97.6 °F)-36.6 °C (97.9 °F)] 36.6 °C (97.9 °F)  Heart Rate:  [59-76] 72  Resp:  [12-54] 12  BP: (106-173)/(52-80) 106/52    Intake/Output Summary (Last 24 hours) at 04/10/19 1315  Last data filed at 04/10/19 1207   Gross per 24 hour   Intake             1500 ml   Output                0 ml   Net             1500 ml       PHYSICAL EXAMINATION      Physical Exam   Constitutional: She is oriented to person, place, and time. She appears well-developed and well-nourished.   HENT:   Head: Normocephalic.   Eyes: Conjunctivae and EOM are normal.   Neck: Normal range of motion. Neck supple.   Cardiovascular: Normal rate, regular rhythm, normal heart sounds and intact distal pulses.    Pulmonary/Chest: Effort normal and breath sounds normal. No respiratory distress.   Abdominal: Soft. Bowel sounds are normal. She exhibits no distension. There is no tenderness.   Musculoskeletal: She exhibits no edema (no LE edema).   R shoulder in sling. Radial pulse 2+   Neurological: She is alert and oriented to person, place, and time.   Skin: Skin is warm and dry. Capillary refill takes less than 2 seconds.   Psychiatric: She has a normal mood and affect. Her behavior is normal.   Vitals reviewed.     LINES, CATHETERS, DRAINS, AIRWAYS, AND WOUNDS   Lines, Drains, Airways, Wounds:  Peripheral IV 04/10/19 Left Forearm (Active)   Number of days: 0       Surgical Incision Arm Right (Active)   Number of days: 0       Comments: R shoulder in sling.     LABS / IMAGING / TELE      Labs  Pre op labs fr 3/26 reviewed    Imaging  n/a    ECG/Telemetry  I have independently reviewed the telemetry. Significant findings include  SR in 70's on PACU Tele.    ASSESSMENT AND PLAN      * S/P shoulder replacement, right   Assessment & Plan    S/p R total shoulder, POD# 0  Pain control per orthopedics  DVT ppx per orthopedics  PT/OT  Encourage IS  Bowel regimen  Follow post op labs       Fibromuscular dysplasia (CMS/HCC) ([facility])   Assessment & Plan    History of fibromuscular dysplasia 2/2 resistant htn. Follows o/p with cardiology, Dr. Wayne  Noted ho CHRISTELLE s/p PTA for FD. Renal US noted patency 2/1 by Rosana.   Was cleared by cardio for procedure.     Epilepsy (CMS/HCC) ([facility])   Assessment & Plan    Reportedly follows with Dr. Dawkins, dx in 2012.  Was cleared by neuro   Continue Lyrica     Essential hypertension   Assessment & Plan    On Hyzaar and Metoprolol  HOLD HCTZ portion of combo  Continue Losartan and Metoprolol   Will monitor BP closely     Hypothyroid   Assessment & Plan    Continue Synthroid     Anxiety   Assessment & Plan    Continue Ativan prn          VTE Assessment: Padua    VTE Prophylaxis Plan: As per ortho, ASA  Disposition Planning: As per ortho     GEOVANNA Cain  4/10/2019

## 2019-04-10 NOTE — ANESTHESIA PROCEDURE NOTES
Peripheral Block    Patient location during procedure: pre-op  Start time: 4/10/2019 9:20 AM  End time: 4/10/2019 9:25 AM  Staffing  Anesthesiologist: WONG, CORINNE K  Performed: anesthesiologist   Preanesthetic Checklist  Completed: patient identified, surgical consent, pre-op evaluation, timeout performed, IV checked, risks and benefits discussed, monitors and equipment checked and sterile field maintained during procedure  Peripheral Block  Prep: ChloraPrep and site prepped and draped  Patient monitoring: heart rate, cardiac monitor, continuous pulse ox and blood pressure  Laterality: right  Injection technique: single-shot      Procedures: ultrasound guided and nerve stimulator  Image captured and stored.        Local infiltration: ropivacaine  Infiltration strength: 0.5 %  Dose: 25 mL  Needle  Test dose: negative  Assessment  Injection assessment: negative aspiration for heme, incremental injection, no paresthesia on injection, local visualized surrounding nerve on ultrasound and transient paresthesias  Paresthesia pain: immediately resolved  Heart rate change: no

## 2019-04-10 NOTE — PROGRESS NOTES
"Patient: Randi Saul  Location: [facility] 5PAV 5446  MRN: [id number]  Today's date: 4/10/2019    Pt left in chair w/ call bell/phone placed w/i reach.    Hospital Course  Mague is a 68 y.o. female admitted on 4/10/2019 with Status post total shoulder arthroplasty [Z96.619]. Principal problem is S/P shoulder replacement, right.    Mague has a past medical history of Anxiety; Cataract; Diverticulitis of colon; Fibromuscular dysplasia (CMS/HCC) ([facility]); Glaucoma suspect of both eyes; History of colostomy reversal; Hypertension; Hypothyroidism; Numbness and tingling of right hand; Perforated bowel (CMS/HCC) ([facility]); Seizures (CMS/HCC) ([facility]) (04/07/2019); and Umbilical hernia.          Therapy Pain/Vitals - 04/10/19 1608        Pain/Comfort/Sleep    Presence of Pain complains of pain/discomfort    Preferred Pain Scale word (verbal rating pain scale)    Pain Body Location abdomen   described as \"gas pain\"    Pain Rating: Rest 2 - mild pain    Pain Rating: Activity 2 - mild pain          Prior Living Environment      Most Recent Value   Living Arrangements  house   Home Accessibility  stairs to enter home, stair glide, stairs within home   Living Environment Comment  Lives w/  in 2SH   Number of Stairs, Main Entrance  two   Stairs Comment, Main Entrance  2STE via garage w/o handrail   Equipment Currently Used at Home  none   Stairs, Within Home, Primary  FF w/ handrail   Stairs Comment, Within Home, Primary  has stairglide          Prior Level of Function      Most Recent Value   Ambulation  independent   Transferring  independent   Toileting  independent   Bathing  independent   Dressing  independent   Eating  independent   Communication  understands/communicates without difficulty   Swallowing  swallows foods/liquids without difficulty   Equipment Currently Used at Home  none                PT Evaluation - 04/10/19 3487        Session Details    Document Type initial evaluation    Mode of Treatment " physical therapy       Time Calculation    Start Time 1608    Stop Time 1628    Time Calculation (min) 20 min       General Information    Patient Profile Reviewed? yes    Existing Precautions/Restrictions fall;weight bearing;brace worn at all times       Weight Bearing Status    Right UE Weight Bearing Status non-weight bearing       Range of Motion (ROM)    General Range of Motion no range of motion deficits identified    Comment, General Range of Motion in BLE's       Manual Muscle Testing (MMT)    General MMT Assessment no strength deficits identified    Comment in BLE's       Bed Mobility    Comment (Bed Mobility) already OOB w/ nsg leaving bathroom at onset of PT       Transfers    Maintains Weight Bearing Status (Transfers) able to maintain weight bearing status   in brace    Comment to chair        Stand to Sit Transfer    Echo, Stand to Sit Transfer modified independence    Comment reaching back w/ LUE able to sit safely w/o difficulty or cues       Gait Analysis/Training    Gait/Stairs Locomotion Gait Training (Group);Stairs Training (Group)       Gait Training    Echo, Gait independent    Assistive Device --   NONE    Distance in Feet 200 feet    Gait Pattern Utilized step-through    Gait Deviations Identified decreased gait speed    Maintains Weight Bearing Status able to maintain weight bearing status   in brace    Comment ambulates fairly well w/o any overt LOB, able to converse & ambulate w/o difficulty       Stairs Training    Echo, Stairs modified independence    Assistive Device railing    Handrail Location left side (ascending)    Number of Stairs 4    Ascending Stairs Technique step-over-step    Descending Stairs Technique step-over-step    Comment negotiated stairs well w/o difficulty        AM-PAC (TM) - Mobility (Current Function)    Turning from your back to your side while in a flat bed without using bedrails? 3 - A Little    Moving from lying on your back to sitting  on the side of a flat bed without using bedrails? 3 - A Little    Moving to and from a bed to a chair? 3 - A Little    Standing up from a chair using your arms? 4 - None    To walk in a hospital room? 4 - None    Climbing 3-5 steps with a railing? 4 - None    AM-PAC (TM) Mobility Score 21       PT Clinical Impression    Patient's Goals For Discharge return home    Rehab Potential/Prognosis good, to achieve stated therapy goals    PT Frequency of Treatment --   NONE    Anticipated Equipment Needs at Discharge none    Daily Outcome Statement Pt moves fairly well w/ PT performed all fxn'l task(s) w/o difficulty - no fxn'l goals req'ing PT intervention @ this time.  Anticipate D/C home.                        Education provided this session. See the Patient Education summary report for full details.

## 2019-04-10 NOTE — ANESTHESIA PREPROCEDURE EVALUATION
Anesthesia ROS/MED HX      Pulmonary - neg  Neuro/Psych    seizures   Anxiety  Cardiovascular   hypertension  Endo/Other   Hypothyroidism  Body Habitus: Normal  ROS/MED HX Comments:    Neurology/Psychology: Dr. Dawkins cleared her amd says seizures stable even though she had a seizure this weekend.  He says it was due to increased anxiety over upcoming surgery   GI/Hepatic/Renal: Diveritivulitis s/p ostomoy and reversal      Past Surgical History:   Procedure Laterality Date   • COLON SURGERY  10/28/2013    Laparoscopic, (single port) resection of sigmoid colostomy, repair hernia, resection portion of sigmoid colon and rectum   • COLON SURGERY  05/21/2014    Colonoscopy to the transverse colon. Poor prep   • COLON SURGERY  04/27/2016    Colonoscopy to base of the cecum   • HERNIA REPAIR     • LAPAROTOMY SALPINGO OOPHORECTOMY Left        Physical Exam    Airway   Mallampati: II   TM distance: >3 FB   Neck ROM: full  Cardiovascular - normal   Rhythm: regular   Rate: normal  Pulmonary - normal   clear to auscultation  Dental - normal        Anesthesia Plan    Plan: general and regional    Technique: general endotracheal and nerve block   ASA 3  Anesthetic plan and risks discussed with: patient  Induction:    intravenous   Postop Plan:   Patient Disposition: inpatient floor planned admission   Pain Management: IV analgesics

## 2019-04-10 NOTE — BRIEF OP NOTE
Right Anatomic Total Shoulder Arthroplasty, biceps tenodesis (R) Procedure Note    Procedure:    Right Anatomic Total Shoulder Arthroplasty, biceps tenodesis  CPT(R) Code:  38223 - VT RECONSTR TOTAL SHOULDER IMPLANT      * No Diagnosis Codes entered *       * No Diagnosis Codes entered *    Surgeon(s) and Role:     * Tanner Lunsford MD - Primary    Anesthesia: General    Staff:   Circulator: Beto Nazario RN; Opal Tomas RN  Scrub Person: Carson Weston RN; Opal Tomas RN    Procedure Details   Right total shoulder arthroplasty    Estimated Blood Loss: No blood loss documented.    Specimens:                No specimens collected during this procedure.      Drains:      Implants:   Implant Name Type Inv. Item Serial No.  Lot No. LRB No. Used   CEMENT BONE SIMPLEX W/TOBRAMYCIN - [id number]  CEMENT BONE SIMPLEX W/TOBRAMYCIN  [employer] [id number] Right 1   GLENOID ALL-POLY PEGGED 42MM - SN/A - [id number]  GLENOID ALL-POLY PEGGED 42MM N/A [employer] [id number] Right 1   HUMERAL NECK NEUTRAL - SN/A - [id number]  HUMERAL NECK NEUTRAL N/A [employer] [id number] Right 1   HUMERAL STEM SHORT 10MM - SN/A - [id number]  HUMERAL STEM SHORT 10MM N/A [employer] KAYLE[employer] [id number] Right 1   HEAD HUMERAL OFFSET 42MM X 16MM - SN/A - [id number]   HEAD HUMERAL OFFSET 42MM X 16MM N/A [employer] KAYLE[employer] [id number] Right 1              Complications:  None; patient tolerated the procedure well.           Disposition: PACU - hemodynamically stable.           Condition: stable    Tanner Lunsford MD  Phone Number: [phone]

## 2019-04-10 NOTE — ASSESSMENT & PLAN NOTE
S/p R total shoulder, POD# 0  Pain control per orthopedics  DVT ppx per orthopedics  PT/OT  Encourage IS  Bowel regimen  Follow post op labs

## 2019-04-10 NOTE — ASSESSMENT & PLAN NOTE
On Hyzaar and Metoprolol  HOLD HCTZ portion of combo  Continue Losartan and Metoprolol   Will monitor BP closely

## 2019-04-10 NOTE — OP NOTE
Operative report     Date of operation: 4/10/2019    Preoperative diagnosis: Glenohumeral arthritis right shoulder M1 9.011 biceps tendinitis right shoulder M 75.21    Postoperative diagnosis: Same    Procedure: Anatomic total shoulder replacement with biceps tenodesis 04235    Surgeon:  Tanner Lunsford Jr MD    Assistant: Jeff Groves MD    Anesthesia:   General with block    Complications: None    Indications : The patient is a 68-year-old female with end-stage glenohumeral arthritis involving her right shoulder with an intact cuff and biceps tendinitis.  She is failed nonoperative management and is brought to the operating for above procedure.    Findings: On examination under anesthesia she had 30 degrees of external rotation with her arm at her side and approximately 130 degrees of elevation.  The cuff was intact.  We took down subscapularis with a lesser tuberosity osteotomy repaired anatomically.  The anterior and inferior capsule was excised.  The remaining capsule was released.  Biceps was tenodesed to the upper border of pectoralis major with 2 Ethibond sutures.  At the completion of the procedure the patient had 45 or 50 degrees of external rotation with her arm at her side and 170 degrees of elevation.  Cephalic vein was preserved and we did not place a drain.    Description of procedure:    After proper written consent was obtained the patient was brought to the operating room placed on the operating table in supine position.  After adequate anesthesia was obtained, the patient's shoulder was examined under anesthesia and the above findings were noted.  The patient was then brought to the edge of the operating table, the operating table was placed in a semirecumbent position, and the patient's arm and shoulder were prepped and draped in normal sterile fashion.  A 10-12 cm deltopectoral incision was made.  The incision was carried sharply down the level of deep fascia.  Cephalic vein and deltoid were  taken laterally and the pectoralis major was taken medially.  The clavipectoral fascia was incised just lateral to the conjoined tendon.  This incision was carried up to but not into the coracoacromial ligament.  Digital palpation was used to verify the integrity of the axillary nerve, which was protected throughout the procedure.  The musculocutaneous nerve was not within our surgical field.  We retracted the conjoined tendon medially and the deltoid laterally.  The anterior humeral circumflex vessels were clamped and coagulated.  Biceps was uncovered from the upper border of pectoralis major to the supraglenoid tubercle.  It was then tenodesed to the upper border of pectoralis major with 2 Ethibond sutures and was then released just proximal to this tenodesis site.  It was then resected from the supraglenoid tubercle.  A large curved osteotome was used to perform a lesser tuberosity osteotomy.  The lesser tuberosity and subscapularis were reflected off the anterior capsule and 3 heavy nonabsorbable sutures were passed through the bone tendon junction and clamped.  The subscapularis and lesser tuberosity were then retracted medially.  The anterior capsule was released off the anatomic neck starting superiorly and extending all the way down past the 6 o'clock position, taking care to protect the axillary nerve.  This allowed us to deliver the humerus into the wound with simultaneous abduction extension and external rotation.  All humeral osteophytes were removed and the anatomic neck was marked.  The humerus was then cut along the anatomic neck and native version leaving 2- 3 mm of bone medial to the rotator cuff reflection.  The rotator cuff was inspected and found to be intact.  We then sized the humeral epiphysis and selected the appropriate head size.  The humerus was then retracted posteriorly with a large Fukuda retractor.  The anterior and inferior capsule was excised in a reverse double pronged Bankart  retractor was placed on the anterior neck of the scapula.  The labrum was excised circumferentially and the remaining capsule was released.  A blunt Hohmann was placed posterior superiorly.  A guidepin was placed for reaming using the appropriate sizer disc and slightly inferiorly tilting it.  The guidepin was drilled to the medial cortex but not through it.  I then reamed over the guidepin until we had a concentric surface.  I then placed a peripheral drill guide and drilled the 3 peripheral holes.  The peripheral drill guide was removed.  We then drilled the center hole by over drilling the guide pin.  The guide pin was then removed.  We inspected all 4 holes and found that all of them were contained within the glenoid vault except the ones mentioned under findings above.  We then pulse irrigated and dried the holes.  A trial glenoid component was placed and fit nicely.  The trial component was then removed and we again pulse irrigated the holes dried the holes and then placed pressurized cement within the center hole and impacted the appropriate sized DJ O anatomic glenoid.  This was completed, the humerus was then redelivered.  We reamed from a size 6 up to the appropriate sized reamer mentioned above.  We then used the appropriate size broach number and then trialed the humeral head.  The appropriate sized humeral head, the appropriate amount of offset was selected.  It was placed anatomically on the humerus and locked into position.  The remaining osteophytes were removed.  The humerus was then reduced.  We had excellent posterior translation with a good endpoint and good anterior soft tissue length.  I therefore then  redelivered the humerus into the wound and removed the trial implant.  A real implant was placed with a FiberWire suture around its neck.  Once this was impacted into position and the humerus was reduced, the 2 free ends of this FiberWire suture were passed through the bone tendon junction of  the lesser tuberosity.  The deep limbs of the 3 previously placed nonabsorbable sutures around the lesser tuberosity were then passed through the osteotomy bed and out the lateral cortex.  The most superior and inferior of these sutures were accompanied by the superior and inferior FiberWire sutures.  The lesser tuberosity was then reduced and the FiberWire sutures were tied laterally thereby compressing the lesser tuberosity on to its footprint.  I then tied the 3 interfragmentary sutures and closed the rotator interval laterally with an additional heavy nonabsorbable suture.  Digital palpation was again used to verify the integrity of the axillary nerve.  We again copiously pulse irrigated. We then evaluated the status of the wound and if a drain was necessary it was placed.  The wound was then closed in standard fashion with 2-0 Vicryl in the subcutaneous tissue and a running subcuticular 3-0 Monocryl for skin.  The patient was then placed in a sterile dressing and a sling.  Patient tolerated the procedure well.    I was personally present for the key portions of the operation which included biceps tenodesis, subscapularis reflection, humeral exposure and cutting, humeral head sizing, glenoid exposure, preparation, and placement of the glenoid component, preparation and placement of the humeral component, evaluation of stability and alignment of both components, and subscapularis repair.

## 2019-04-10 NOTE — PROGRESS NOTES
Orthopedic Post Surgical Note    67 y/o female s/p right anatomic total shoulder athroplasty    Physical Exam:   - dressing clean/dry/intact, right arm in sling  - palpable radial pulses  - no motor/sensation due to anesthesia/block    A/P:  - pain control  - physical therapy/occupational therapy  - DVT prophylaxis  - recheck motor/sensation

## 2019-04-10 NOTE — PLAN OF CARE
Problem: Patient Care Overview  Goal: Plan of Care Review  Outcome: Ongoing (interventions implemented as appropriate)   04/10/19 4128   Coping/Psychosocial   Plan Of Care Reviewed With patient   Plan of Care Review   Progress improving   Outcome Summary pt seen by PTNarciso NASSAR X 1 assist. voiding. no pain at this time.      Goal: Individualization & Mutuality  Outcome: Ongoing (interventions implemented as appropriate)      Problem: Shoulder Arthroplasty (Adult)  Goal: Signs and Symptoms of Listed Potential Problems Will be Absent, Minimized or Managed (Shoulder Arthroplasty)  Outcome: Ongoing (interventions implemented as appropriate)    Goal: Anesthesia/Sedation Recovery  Outcome: Outcome(s) Achieved Date Met: 04/10/19

## 2019-04-10 NOTE — ANESTHESIA POSTPROCEDURE EVALUATION
Patient: Randi Saul    Procedure Summary     Date:  04/10/19 Room / Location:  [facility] OR  / [facility] OR [facility]    Anesthesia Start:  0915 Anesthesia Stop:  1216    Procedure:  Right Anatomic Total Shoulder Arthroplasty, biceps tenodesis (Right ) Diagnosis:  (BICEPS TENDONITIS)    Surgeon:  Tanner Lunsford MD Responsible Provider:  Wong, Corinne K, MD    Anesthesia Type:  general, regional ASA Status:  3          Anesthesia Type: general, regional  PACU Vitals  4/10/2019 1208 - 4/10/2019 1252      4/10/2019 1215 4/10/2019 1230 4/10/2019 1240       BP: 121/62 126/64 122/62     Temp: 36.6 °C (97.9 °F) - -     Pulse: 76 69 73     Resp: 16 (!)  54 12     SpO2: 100 % 100 % 98 %             Anesthesia Post Evaluation    Pain management: adequate  Patient participation: complete - patient participated  Level of consciousness: awake and alert  Cardiovascular status: acceptable  Airway Patency: adequate  Respiratory status: acceptable  Hydration status: acceptable  Anesthetic complications: no

## 2019-04-10 NOTE — PLAN OF CARE
Problem: Patient Care Overview  Goal: Plan of Care Review  Outcome: Ongoing (interventions implemented as appropriate)   04/10/19 0050   Coping/Psychosocial   Plan Of Care Reviewed With patient   Plan of Care Review   Outcome Summary Pt moves well performing all fxn'l task(s) w/o difficulty - no fxn'l goals for PT @ this time

## 2019-04-10 NOTE — OR SURGEON
Pre-Procedure patient identification:  I am the primary operating surgeon/proceduralist and I have identified the patient on 04/10/19 at 8:32 AM Tanner Lunsford MD  Phone Number: [phone]

## 2019-04-11 VITALS
RESPIRATION RATE: 18 BRPM | OXYGEN SATURATION: 96 % | HEART RATE: 82 BPM | DIASTOLIC BLOOD PRESSURE: 78 MMHG | BODY MASS INDEX: 23.95 KG/M2 | WEIGHT: 122 LBS | HEIGHT: 60 IN | TEMPERATURE: 96.9 F | SYSTOLIC BLOOD PRESSURE: 138 MMHG

## 2019-04-11 PROBLEM — Z96.619 STATUS POST SHOULDER REPLACEMENT: Status: ACTIVE | Noted: 2019-04-11

## 2019-04-11 LAB
ANION GAP SERPL CALC-SCNC: 9 MEQ/L (ref 3–15)
BUN SERPL-MCNC: 14 MG/DL (ref 8–20)
CALCIUM SERPL-MCNC: 8.3 MG/DL (ref 8.9–10.3)
CHLORIDE SERPL-SCNC: 104 MEQ/L (ref 98–109)
CO2 SERPL-SCNC: 23 MEQ/L (ref 22–32)
CREAT SERPL-MCNC: 0.7 MG/DL
ERYTHROCYTE [DISTWIDTH] IN BLOOD BY AUTOMATED COUNT: 12.5 % (ref 11.7–14.4)
GFR SERPL CREATININE-BSD FRML MDRD: >60 ML/MIN/1.73M*2
GLUCOSE SERPL-MCNC: 100 MG/DL (ref 70–99)
HCT VFR BLDCO AUTO: 28.3 %
HGB BLD-MCNC: 9.4 G/DL
MCH RBC QN AUTO: 31 PG (ref 28–33.2)
MCHC RBC AUTO-ENTMCNC: 33.2 G/DL (ref 32.2–35.5)
MCV RBC AUTO: 93.4 FL (ref 83–98)
PDW BLD AUTO: 9.8 FL (ref 9.4–12.3)
PLATELET # BLD AUTO: 163 K/UL
POTASSIUM SERPL-SCNC: 3.8 MEQ/L (ref 3.6–5.1)
RBC # BLD AUTO: 3.03 M/UL (ref 3.93–5.22)
SODIUM SERPL-SCNC: 136 MEQ/L (ref 136–144)
WBC # BLD AUTO: 5.32 K/UL

## 2019-04-11 PROCEDURE — 63600000 HC DRUGS/DETAIL CODE: Performed by: STUDENT IN AN ORGANIZED HEALTH CARE EDUCATION/TRAINING PROGRAM

## 2019-04-11 PROCEDURE — 25800000 HC PHARMACY IV SOLUTIONS: Performed by: NURSE PRACTITIONER

## 2019-04-11 PROCEDURE — 36415 COLL VENOUS BLD VENIPUNCTURE: CPT | Performed by: STUDENT IN AN ORGANIZED HEALTH CARE EDUCATION/TRAINING PROGRAM

## 2019-04-11 PROCEDURE — 63700000 HC SELF-ADMINISTRABLE DRUG: Performed by: ORTHOPAEDIC SURGERY

## 2019-04-11 PROCEDURE — 63700000 HC SELF-ADMINISTRABLE DRUG: Performed by: NURSE PRACTITIONER

## 2019-04-11 PROCEDURE — 80048 BASIC METABOLIC PNL TOTAL CA: CPT | Performed by: STUDENT IN AN ORGANIZED HEALTH CARE EDUCATION/TRAINING PROGRAM

## 2019-04-11 PROCEDURE — 97165 OT EVAL LOW COMPLEX 30 MIN: CPT | Mod: GO

## 2019-04-11 PROCEDURE — 85027 COMPLETE CBC AUTOMATED: CPT | Performed by: STUDENT IN AN ORGANIZED HEALTH CARE EDUCATION/TRAINING PROGRAM

## 2019-04-11 PROCEDURE — 97530 THERAPEUTIC ACTIVITIES: CPT | Mod: GO

## 2019-04-11 PROCEDURE — 63700000 HC SELF-ADMINISTRABLE DRUG: Performed by: STUDENT IN AN ORGANIZED HEALTH CARE EDUCATION/TRAINING PROGRAM

## 2019-04-11 RX ORDER — ACETAMINOPHEN AND CODEINE PHOSPHATE 300; 30 MG/1; MG/1
1-2 TABLET ORAL EVERY 4 HOURS PRN
Start: 2019-04-11 | End: 2019-04-16

## 2019-04-11 RX ADMIN — LORAZEPAM 0.5 MG: 0.5 TABLET ORAL at 10:20

## 2019-04-11 RX ADMIN — PREGABALIN 50 MG: 50 CAPSULE ORAL at 14:06

## 2019-04-11 RX ADMIN — FAMOTIDINE 20 MG: 20 TABLET ORAL at 08:11

## 2019-04-11 RX ADMIN — KETOROLAC TROMETHAMINE 15 MG: 15 INJECTION, SOLUTION INTRAMUSCULAR; INTRAVENOUS at 05:12

## 2019-04-11 RX ADMIN — PREGABALIN 50 MG: 50 CAPSULE ORAL at 08:11

## 2019-04-11 RX ADMIN — ASPIRIN 81 MG 81 MG: 81 TABLET ORAL at 08:11

## 2019-04-11 RX ADMIN — ACETAMINOPHEN AND CODEINE PHOSPHATE 2 TABLET: 300; 30 TABLET ORAL at 12:28

## 2019-04-11 RX ADMIN — LOSARTAN POTASSIUM 50 MG: 50 TABLET, FILM COATED ORAL at 08:11

## 2019-04-11 RX ADMIN — POLYETHYLENE GLYCOL 3350 17 G: 17 POWDER, FOR SOLUTION ORAL at 08:11

## 2019-04-11 RX ADMIN — ACETAMINOPHEN AND CODEINE PHOSPHATE 2 TABLET: 300; 30 TABLET ORAL at 08:12

## 2019-04-11 RX ADMIN — LEVOTHYROXINE SODIUM 100 MCG: 100 TABLET ORAL at 05:11

## 2019-04-11 RX ADMIN — SODIUM CHLORIDE: 9 INJECTION, SOLUTION INTRAVENOUS at 03:53

## 2019-04-11 RX ADMIN — SENNOSIDES AND DOCUSATE SODIUM 1 TABLET: 8.6; 5 TABLET ORAL at 08:11

## 2019-04-11 RX ADMIN — ALUMINUM HYDROXIDE, MAGNESIUM HYDROXIDE, AND SIMETHICONE 30 ML: 200; 200; 20 SUSPENSION ORAL at 13:39

## 2019-04-11 RX ADMIN — ACETAMINOPHEN AND CODEINE PHOSPHATE 2 TABLET: 300; 30 TABLET ORAL at 03:57

## 2019-04-11 ASSESSMENT — COGNITIVE AND FUNCTIONAL STATUS - GENERAL
HELP NEEDED FOR PERSONAL GROOMING: 3 - A LITTLE
TOILETING: 3 - A LITTLE
DRESSING REGULAR UPPER BODY CLOTHING: 3 - A LITTLE
EATING MEALS: 3 - A LITTLE
HELP NEEDED FOR BATHING: 2 - A LOT
DRESSING REGULAR LOWER BODY CLOTHING: 3 - A LITTLE

## 2019-04-11 NOTE — DISCHARGE INSTRUCTIONS
Non weight bearing Right upper extremity. Passive forward elevation to 140 degrees; external rotation to 40 degrees.       Procedure name: Total Shoulder Arthroplasty - Jonn     Symptoms to call for instructions  The following are CONCERNING SYMPTOMS after a total shoulder replacement    PLEASE CALL YOUR DOCTOR IF:                * You have excessive redness of the incision.              * You have drainage for more than 4 days.              * You have a fever greater than 101.5 degrees Fahrenheit.    Activity restrictions instructions  The following are ACTIVITY RESTRICTIONS after a total shoulder replacement    * A sling has been provided for you.  Remove the sling 5 times each day to perform motion exercises as taught in the hospital.  * After the first 48 to 72 hours, you may remove the sling to bathe, dress, and complete any range of motion exercises taught to you prior to discharge from the hospital.  * You will be given further instructions on sling use at your first postoperative visit.    * Active reaching and lifting are not permitted.  You may use the operative arm for activities of daily living that do not require the operative arm to leave the side of the body, such as eating, drinking, bathing, etc.  * You should perform assisted overhead reaching and external rotation (outward turning) exercises with the operative arm if taught and instructed to do so prior to discharge from the hospital.  Both exercises should be done with the non-operative arm used as the “therapist arm” while the operative arm remains relaxed.  Ten of each exercise should be done five times daily.   *  Overhead Reach Exercise is helping to lift your stiff arm up as high as it will go.  To stretch your overhead reach, lie flat on your back, relax, and grasp the wrist of the tight shoulder with your opposite hand.  Using the power in your opposite arm, bring the stiff arm up as far as it is comfortable.  Start holding it for ten  seconds and then work up to where you can hold it for a count of 30.  Breathe slowly and deeply while the arm is moved.  Repeat this stretch ten times, trying to help the arm up a little higher each time.     * External Rotation Exercise is turning the arm out to the side while your elbow stays close to your body.  External rotation is best stretched while you are lying on your back.  Hold a cane, yardstick, broom handle, or golf club in both hands.  Bend both elbows to a right angle.  Use steady, gentle force from your normal arm to rotate the hand of the stiff shoulder out away from your body.  Continue the rotation as far as it will go comfortably, holding it there for a count of 10.  Repeat this exercise ten times.          Wound care instructions  The following are INCISION CARE instructions after a total shoulder replacement           * Use ice on the shoulder intermittently over the first 48 hours after surgery                   (20 minutes on and 20 minutes off).  • If you have a beige tape dressing, you may remove it after 2 days.  • If you have a clear plastic dressing, it is waterproof and should not be removed  * You may shower immediately with the waterproof dressing. Otherwise, shower after the 5th day. The incision site can get wet after day 5 but CANNOT be submerged under water until your sutures/staples are removed.  * Do not rub the incision.  Make sure your axilla (armpit) is completely dry after showering.    Additional supplement instructions  In addition,    * Pain medication has been prescribed for you.  Take a stool softener (Colace, Dulcolax or Senakot) if you are using narcotic pain medications.  * Use your medication liberally as directed over the first 48 hours, and then begin to taper your use.  You may take Extra Strength Tylenol or Tylenol in addition to the narcotic pills early in the post-operative period (do not exceed 3g of Tylenol per day) and while titrating off of the narcotics  as your pain improves.  * DO NOT take ANY nonsteroidal anti-inflammatory pain medications: Advil, Motrin, Ibuprofen, Aleve, Naproxen, or Naprosyn.    * Take one 81mg aspirin once a day for 6 weeks after surgery, unless you have an aspirin sensitivity /allergy or asthma.  * Please call [phone] or [phone] for any problems.  * Please call [phone] to make a follow-up appointment.  You should see the doctor 10-14 days after your surgery.

## 2019-04-11 NOTE — PLAN OF CARE
Problem: Patient Care Overview  Goal: Plan of Care Review  Outcome: Ongoing (interventions implemented as appropriate)   04/11/19 0220   Coping/Psychosocial   Plan Of Care Reviewed With patient   Plan of Care Review   Progress improving   Outcome Summary Pt OOB, sat up in chair, ambulated in hallway with supervision; voiding; tolerating diet; on room air; pain unrelieved with 1 tab tylenol #3, additional orders received and carried out: PRN .5mg dilaudid and ATC toradol.      Goal: Individualization & Mutuality  Outcome: Ongoing (interventions implemented as appropriate)   04/11/19 0220   Individualization   Patient Specific Goals Pt would like to be discharged home today (4/11).       Problem: Shoulder Arthroplasty (Adult)  Goal: Signs and Symptoms of Listed Potential Problems Will be Absent, Minimized or Managed (Shoulder Arthroplasty)  Outcome: Ongoing (interventions implemented as appropriate)

## 2019-04-11 NOTE — PROGRESS NOTES
Orthopaedic Surgery Postoperative Check    [S]  Pt doing well postoperatively.  Pt denies CP or SOB.    [O]    Vitals:    04/11/19 0700   BP: (!) 119/56   Pulse: 70   Resp: 18   Temp: 36.4 °C (97.6 °F)   SpO2: 97%       CBC Results       04/11/19 03/26/19 01/31/19                    0328 1143 0450         WBC 5.32 5.55 5.03         RBC 3.03 (L) 3.95 4.08         HGB 9.4 (L) 12.2 12.7         HCT 28.3 (L) 36.1 37.2         MCV 93.4 91.4 91.2         MCH 31.0 30.9 31.1         MCHC 33.2 33.8 34.1          239 220                       Physical Exam    RUE  Inspection: No gross deformity.  Dressing CDI  Neurovascular: Palpable Radial Pulse. SILT Median, Ulnar, and Radial Distributions  Motor: + IO/WF/WE/EF/EE/Abduction.  Compartments: Soft and Compressible    AP: 68 y.o. female s/p T TSA    -- PT/OT  -- NWB:  ER40  -- DVT: ASA  -- Pain Control  -- Monitor neurovascular status

## 2019-04-11 NOTE — PROGRESS NOTES
Patient: Randi Saul  Location: [facility] 5446  MRN: [id number]  Today's date: 4/11/2019   Patient sitting in recliner w/ alarms activated , call bell nearby & dressed  For d/c  .     Hospital Course  Mague is a 68 y.o. female admitted on 4/10/2019 with Status post total shoulder arthroplasty [Z96.619]. Principal problem is S/P shoulder replacement, right.    Mague has a past medical history of Anxiety; Cataract; Diverticulitis of colon; Fibromuscular dysplasia (CMS/HCC) ([facility]); Glaucoma suspect of both eyes; History of colostomy reversal; Hypertension; Hypothyroidism; Numbness and tingling of right hand; Perforated bowel (CMS/HCC) ([facility]); Seizures (CMS/HCC) ([facility]) (04/07/2019); and Umbilical hernia.          Therapy Pain/Vitals - 04/11/19 1045        Pain/Comfort/Sleep    Pain Charting Type Pain Assessment    Presence of Pain complains of pain/discomfort    Preferred Pain Scale word (verbal rating pain scale)    Pain Body Location - Side Right    Pain Body Location shoulder    Pain Rating: Rest 2 - mild pain    Pain Rating: Activity 2 - mild pain    Pain Management Interventions position adjusted;premedicated for activity          Prior Living Environment      Most Recent Value   Living Arrangements  house   Home Accessibility  stairs to enter home   Living Environment Comment  lives w/ spouse in 2 SH  w/ first floor powder room w/ B & B on 2nd floor    Number of Stairs, Main Entrance  two   Stairs Comment, Main Entrance  2STE via garage w/o handrail   Equipment Currently Used at Home  none   Stairs, Within Home, Primary  FF w/ handrail   Stairs Comment, Within Home, Primary  has stairglide          Prior Level of Function      Most Recent Value   Ambulation  independent   Transferring  independent   Toileting  independent   Bathing  independent   Dressing  independent   Eating  independent   Communication  understands/communicates without difficulty   Swallowing  swallows foods/liquids without  difficulty   Equipment Currently Used at Home  none   Prior Functional Level Comment  IND w/ adl's mobility w/o device                 OT Evaluation - 04/11/19 1045        Session Details    Document Type initial evaluation    Mode of Treatment physical therapy    Patient/Family Observations patient in bathroom        Time Calculation    Start Time 1045    Stop Time 1108    Time Calculation (min) 23 min       General Information    Patient Profile Reviewed? yes    Pertinent History of Current Functional Problem R TSR     Existing Precautions/Restrictions fall;weight bearing       Weight Bearing Status    Right UE Weight Bearing Status non-weight bearing       Orientation Log    Comment AAO x3        Cognition/Psychosocial    Safety Awareness other (see comments)   can be impulsive        Sensory    Sensory General Assessment no sensation deficits identified       Range of Motion (ROM)    Comment, General Range of Motion L UE  WFL throughout : R UE  NWB  w/  R hand as functional assist in black immobilizer        Manual Muscle Testing (MMT)    Comment L UE   WFL throughout : R UE shoulder NWB w/ hand as a functional assist        Bed Mobility    Bed Mobility bed mobility activities;rolling left;supine to sit;sit to supine    [place], Roll Left supervision    Comment (Bed Mobility) flat surface        Transfers    Maintains Weight Bearing Status (Transfers) able to maintain weight bearing status    Comment SUP w/o device  w/ R UE in NWB black immobilizer        Bed to Chair Transfer    [place], Bed to Chair supervision    Comment w/o device w/ R UE in black immobilizer NWB        Chair to Bed Transfer    [place], Chair to Bed supervision    Comment w/o device w/ R UE in immobilizer  NWB        Sit to Stand Transfer    [place], Sit to Stand Transfer supervision    Comment w/o device w/ R UE NWB in black immobilizer        Stand to Sit Transfer    [place], Stand to Sit Transfer supervision     Comment w/o device        Toilet Transfer    [place], Toilet Transfer supervision    Assistive Device raised toilet seat;grab bars/safety frame       Upper Body Dressing    Upper Body Dressing Tasks don;front-opening garment    Upper Body Dressing [place] supervision       Lower Body Dressing    Lower Body Dressing Tasks doff;don;pants/bottoms;shoes/slippers;underwear    Lower Body Dressing [place] supervision       AM-PAC (TM) - ADL (Current Function)    Putting on and taking off regular lower body clothing? 3 - A Little    Bathing? 2 - A Lot    Toileting? 3 - A Little    Putting on/taking off regular upper body clothing? 3 - A Little    How much help for taking care of personal grooming? 3 - A Little    Eating meals? 3 - A Little    AM-PAC (TM) ADL Score 17       OT Clinical Impression    Patient's Goals For Discharge return to all previous roles/activities    Plan For Care Reviewed: Occupational Therapy OT plan for care discussed with family;OT plan for care discussed with patient    Impairments Found (OT Eval) aerobic capacity/endurance    Rehab Potential/Prognosis: Occupational Therapy good, to achieve stated therapy goals    OT Frequency of Treatment --   cleasred for d/c home     Problem List: Occupational Therapy strength decreased    Anticipated Equipment Needs at Discharge none    OT Recommended Discharge Disposition home with caregiver    Daily Outcome Statement [facility] : 17:D/ C OT . Patient is SUP w/ bed mobility , STS , adl's plus mobility w/o device activities. Patient & spouse instructed & practiced SROM R shoulfer exercises to promote functional mobility .  OT recommendations provided during eval .Supportive spouse available. No additional acute care goals at this time.           Pain Assessment/Intervention  Pain Charting Type: Pain Assessment             Education provided this session. See the Patient Education summary report for full details.

## 2019-04-11 NOTE — PATIENT CARE CONFERENCE
Care Progression Rounds Note  Date: 4/11/2019  Time: 10:10 AM     Patient Name: Randi Saul     Medical Record Number: [id number]   YOB: 1950  Sex: Female      Room/Bed: 5446    Admitting Diagnosis: Status post total shoulder arthroplasty [Z96.619]   Admit Date/Time: 4/10/2019  7:35 AM    Primary Diagnosis: S/P shoulder replacement, right  Principal Problem: S/P shoulder replacement, right    GMLOS: pending  Anticipated Discharge Date: 4/11/2019    AM-PAC  Mobility Score: 18    Discharge Planning:  Living Arrangements: house  Concerns To Be Addressed: no discharge needs identified    Barriers to Discharge:  Barriers to Discharge: Therapy update pending    Participants:  advanced practice provider, , nursing, physical therapy, social work/services

## 2019-04-11 NOTE — PLAN OF CARE
Problem: Patient Care Overview  Goal: Plan of Care Review  Outcome: Ongoing (interventions implemented as appropriate)   04/11/19 7535   Coping/Psychosocial   Plan Of Care Reviewed With patient   Plan of Care Review   Progress improving       Problem: Shoulder Arthroplasty (Adult)  Goal: Signs and Symptoms of Listed Potential Problems Will be Absent, Minimized or Managed (Shoulder Arthroplasty)  Outcome: Ongoing (interventions implemented as appropriate)

## 2019-04-11 NOTE — NURSING NOTE
Pt reports R shoulder pain 10/10, 1 tablet Tylenol #3 last given at 2135. Dr. Abraham notified. Orders received: PRN 0.5mg Dilaudid, Q6H 15mg Toradol. See MAR for documentation.

## 2019-04-11 NOTE — PLAN OF CARE
Problem: Patient Care Overview  Goal: Discharge Needs Assessment  Outcome: Ongoing (interventions implemented as appropriate)   04/11/19 2490   DC Needs Assessment   Concerns To Be Addressed no discharge needs identified;denies needs/concerns at this time   Readmission Within The Last 30 Days no previous admission in last 30 days   Anticipated Discharge Disposition home with home health services   Type of Home Care Services home PT   Current Discharge Risk dependent with mobility/activities of daily living   Current Health   Anticipated Changes Related to Illness inability to care for self   Activity/Self Care ROS   Equipment Currently Used at Home none   Met with patient. Independent with adls prior to admission. Lives with  and plans to return home at discharge. Seen by Dr Lunsford and [facility] home pt approved. Notified Juana Oliver RN [facility] navigator and she has arranged [facility] . Patient aware and in agreement. [facility] will call patient and make appointment.  at bedside.

## 2019-04-15 ENCOUNTER — TELEPHONE (OUTPATIENT)
Dept: COLORECTAL SURGERY | Facility: CLINIC | Age: 69
End: 2019-04-15

## 2019-04-15 NOTE — TELEPHONE ENCOUNTER
Pts  returned call, stated pt has shoulder surgery last week and is recovering well. They would like to get a date for September. Pt scheduled, will mail prep packet, pt will call with any questions in the meantime.

## 2019-06-07 ENCOUNTER — HOSPITAL ENCOUNTER (EMERGENCY)
Facility: HOSPITAL | Age: 69
Discharge: HOME | End: 2019-06-07
Attending: EMERGENCY MEDICINE
Payer: MEDICARE

## 2019-06-07 ENCOUNTER — APPOINTMENT (EMERGENCY)
Dept: RADIOLOGY | Facility: HOSPITAL | Age: 69
End: 2019-06-07
Payer: MEDICARE

## 2019-06-07 VITALS
DIASTOLIC BLOOD PRESSURE: 78 MMHG | BODY MASS INDEX: 23.56 KG/M2 | WEIGHT: 120 LBS | HEIGHT: 60 IN | TEMPERATURE: 98.3 F | SYSTOLIC BLOOD PRESSURE: 166 MMHG | OXYGEN SATURATION: 95 % | RESPIRATION RATE: 17 BRPM | HEART RATE: 71 BPM

## 2019-06-07 DIAGNOSIS — I10 ESSENTIAL HYPERTENSION: Primary | ICD-10-CM

## 2019-06-07 LAB
ALBUMIN SERPL-MCNC: 4.8 G/DL (ref 3.4–5)
ALP SERPL-CCNC: 59 IU/L (ref 35–126)
ALT SERPL-CCNC: 22 IU/L (ref 11–54)
ANION GAP SERPL CALC-SCNC: 8 MEQ/L (ref 3–15)
AST SERPL-CCNC: 24 IU/L (ref 15–41)
BASOPHILS # BLD: 0.08 K/UL (ref 0.01–0.1)
BASOPHILS NFR BLD: 1.2 %
BILIRUB SERPL-MCNC: 0.8 MG/DL (ref 0.3–1.2)
BUN SERPL-MCNC: 16 MG/DL (ref 8–20)
CALCIUM SERPL-MCNC: 9.5 MG/DL (ref 8.9–10.3)
CHLORIDE SERPL-SCNC: 104 MEQ/L (ref 98–109)
CO2 SERPL-SCNC: 25 MEQ/L (ref 22–32)
CREAT SERPL-MCNC: 0.8 MG/DL
DIFFERENTIAL METHOD BLD: ABNORMAL
EOSINOPHIL # BLD: 0.04 K/UL (ref 0.04–0.36)
EOSINOPHIL NFR BLD: 0.6 %
ERYTHROCYTE [DISTWIDTH] IN BLOOD BY AUTOMATED COUNT: 12.4 % (ref 11.7–14.4)
GFR SERPL CREATININE-BSD FRML MDRD: >60 ML/MIN/1.73M*2
GLUCOSE SERPL-MCNC: 106 MG/DL (ref 70–99)
HCT VFR BLDCO AUTO: 38.7 %
HGB BLD-MCNC: 12.8 G/DL
IMM GRANULOCYTES # BLD AUTO: 0.03 K/UL (ref 0–0.08)
IMM GRANULOCYTES NFR BLD AUTO: 0.5 %
LYMPHOCYTES # BLD: 1.19 K/UL (ref 1.2–3.5)
LYMPHOCYTES NFR BLD: 18.3 %
MCH RBC QN AUTO: 30.7 PG (ref 28–33.2)
MCHC RBC AUTO-ENTMCNC: 33.1 G/DL (ref 32.2–35.5)
MCV RBC AUTO: 92.8 FL (ref 83–98)
MONOCYTES # BLD: 0.31 K/UL (ref 0.28–0.8)
MONOCYTES NFR BLD: 4.8 %
NEUTROPHILS # BLD: 4.87 K/UL (ref 1.7–7)
NEUTS SEG NFR BLD: 74.6 %
NRBC BLD-RTO: 0 %
PDW BLD AUTO: 9.5 FL (ref 9.4–12.3)
PLATELET # BLD AUTO: 222 K/UL
POTASSIUM SERPL-SCNC: 3.5 MEQ/L (ref 3.6–5.1)
PROT SERPL-MCNC: 7.2 G/DL (ref 6–8.2)
RBC # BLD AUTO: 4.17 M/UL (ref 3.93–5.22)
SODIUM SERPL-SCNC: 137 MEQ/L (ref 136–144)
TROPONIN I SERPL-MCNC: 0.02 NG/ML
WBC # BLD AUTO: 6.52 K/UL

## 2019-06-07 PROCEDURE — 36415 COLL VENOUS BLD VENIPUNCTURE: CPT

## 2019-06-07 PROCEDURE — 80053 COMPREHEN METABOLIC PANEL: CPT | Performed by: EMERGENCY MEDICINE

## 2019-06-07 PROCEDURE — 85025 COMPLETE CBC W/AUTO DIFF WBC: CPT | Performed by: EMERGENCY MEDICINE

## 2019-06-07 PROCEDURE — 3E033GC INTRODUCTION OF OTHER THERAPEUTIC SUBSTANCE INTO PERIPHERAL VEIN, PERCUTANEOUS APPROACH: ICD-10-PCS | Performed by: EMERGENCY MEDICINE

## 2019-06-07 PROCEDURE — 93005 ELECTROCARDIOGRAM TRACING: CPT | Performed by: PHYSICIAN ASSISTANT

## 2019-06-07 PROCEDURE — 84484 ASSAY OF TROPONIN QUANT: CPT

## 2019-06-07 PROCEDURE — 63600000 HC DRUGS/DETAIL CODE: Performed by: PHYSICIAN ASSISTANT

## 2019-06-07 PROCEDURE — 99284 EMERGENCY DEPT VISIT MOD MDM: CPT | Mod: 25

## 2019-06-07 PROCEDURE — 96374 THER/PROPH/DIAG INJ IV PUSH: CPT

## 2019-06-07 PROCEDURE — 71045 X-RAY EXAM CHEST 1 VIEW: CPT

## 2019-06-07 PROCEDURE — 80053 COMPREHEN METABOLIC PANEL: CPT

## 2019-06-07 PROCEDURE — 85025 COMPLETE CBC W/AUTO DIFF WBC: CPT

## 2019-06-07 PROCEDURE — 84484 ASSAY OF TROPONIN QUANT: CPT | Performed by: EMERGENCY MEDICINE

## 2019-06-07 RX ORDER — HYDRALAZINE HYDROCHLORIDE 20 MG/ML
INJECTION INTRAMUSCULAR; INTRAVENOUS
Status: DISCONTINUED
Start: 2019-06-07 | End: 2019-06-08 | Stop reason: HOSPADM

## 2019-06-07 RX ORDER — HYDRALAZINE HYDROCHLORIDE 20 MG/ML
10 INJECTION INTRAMUSCULAR; INTRAVENOUS ONCE
Status: COMPLETED | OUTPATIENT
Start: 2019-06-07 | End: 2019-06-07

## 2019-06-07 RX ORDER — LOSARTAN POTASSIUM 50 MG/1
50 TABLET ORAL 2 TIMES DAILY
COMMUNITY

## 2019-06-07 RX ADMIN — HYDRALAZINE HYDROCHLORIDE 10 MG: 20 INJECTION INTRAMUSCULAR; INTRAVENOUS at 21:14

## 2019-06-07 ASSESSMENT — ENCOUNTER SYMPTOMS
ABDOMINAL PAIN: 0
COLOR CHANGE: 0
PALPITATIONS: 0
ARTHRALGIAS: 0
HEMATURIA: 0
FEVER: 0
HEADACHES: 0
BLURRED VISION: 0
WEAKNESS: 0
HYPERTENSION: 1
SORE THROAT: 0
DIZZINESS: 0
CHILLS: 0
COUGH: 0
BACK PAIN: 0
SHORTNESS OF BREATH: 0
DYSURIA: 0
VOMITING: 0
EYE PAIN: 0

## 2019-06-08 NOTE — DISCHARGE INSTRUCTIONS
You were evaluated for high blood pressure.  Your blood work, chest x-ray and EKG unremarkable. Please continue with your antihypertensives as prescribed. Please follow up with your primary care provider within 3 days for repeat blood pressure check. Please return to the ED for any new or concerning symptoms.

## 2019-06-08 NOTE — ED PROVIDER NOTES
Patient is a 68-year-old female presenting to the ED for elevated blood pressure.  Patient stating that she has been taking 50 and 12.5 mg grams daily,  She was supposed to increase her losartan to 100 mg daily, but has not because she has been recovering from a recent right shoulder surgery.  Patient is routinely monitoring her blood pressures at home, they were elevated earlier today. Pt denies CP, dyspnea, HA, dizziness, or changes in vision         History provided by:  Patient and spouse   used: No    Hypertension   Severity:  Mild  Onset quality:  Gradual  Timing:  Constant  Progression:  Worsening  Chronicity:  New  Context: not caffeine    Ineffective treatments:  Angiotensin blockers and beta blockers  Associated symptoms: chest pain    Associated symptoms: no abdominal pain, no blurred vision, no dizziness, no ear pain, no fever, no headaches, no hematuria, no palpitations, no peripheral edema, no shortness of breath, not vomiting and no weakness    Risk factors: no kidney disease        Past Medical History:   Diagnosis Date   • Anxiety    • Cataract    • Diverticulitis of colon    • Fibromuscular dysplasia (CMS/HCC) (HCC)    • Glaucoma suspect of both eyes    • History of colostomy reversal    • Hypertension    • Hypothyroidism    • Numbness and tingling of right hand    • Perforated bowel (CMS/HCC) (HCC)    • Seizures (CMS/HCC) (HCC) 04/07/2019   • Umbilical hernia        Past Surgical History:   Procedure Laterality Date   • COLON SURGERY  10/28/2013    Laparoscopic, (single port) resection of sigmoid colostomy, repair hernia, resection portion of sigmoid colon and rectum   • COLON SURGERY  05/21/2014    Colonoscopy to the transverse colon. Poor prep   • COLON SURGERY  04/27/2016    Colonoscopy to base of the cecum   • HERNIA REPAIR     • LAPAROTOMY SALPINGO OOPHORECTOMY Left        Allergies   Allergen Reactions   • Azithromycin Diarrhea   • Levetiracetam      Other reaction(s):  sick  cannot tolerate    • Oxycodone GI intolerance   • Amoxicillin Trihydrate Rash   • Potassium Clavulanate Rash     Not allergic per pt   • Sulfa (Sulfonamide Antibiotics) Rash       History   Smoking Status   • Former Smoker   • Packs/day: 1.00   • Years: 15.00   • Types: Cigarettes   Smokeless Tobacco   • Never Used     Comment: quit 35 years ago       History   Alcohol Use   • 4.2 oz/week   • 7 Glasses of wine per week     Comment: occasionally        History   Drug Use No       No LMP recorded. Patient is postmenopausal.    Review of Systems   Constitutional: Negative for chills and fever.   HENT: Negative for ear pain and sore throat.    Eyes: Negative for blurred vision, pain and visual disturbance.   Respiratory: Negative for cough and shortness of breath.    Cardiovascular: Positive for chest pain. Negative for palpitations.   Gastrointestinal: Negative for abdominal pain and vomiting.   Genitourinary: Negative for dysuria and hematuria.   Musculoskeletal: Negative for arthralgias and back pain.   Skin: Negative for color change and rash.   Neurological: Negative for dizziness, syncope, weakness and headaches.       Vitals:    06/07/19 1940 06/07/19 2042   BP: (!) 179/83 (!) 186/86   BP Location: Right upper arm    Patient Position: Sitting    Pulse: 68 69   Resp: 18 16   Temp: 37.8 °C (100.1 °F) 36.8 °C (98.3 °F)   TempSrc: Temporal Oral   SpO2: (!) 87% 97%   Weight: 54.4 kg (120 lb)    Height: 1.524 m (5')        Physical Exam   Constitutional: She appears well-developed and well-nourished. No distress.   HENT:   Head: Normocephalic and atraumatic.   Eyes: Conjunctivae are normal.   Neck: Neck supple.   Cardiovascular: Normal rate and regular rhythm.    No murmur heard.  Pulmonary/Chest: Effort normal and breath sounds normal. No respiratory distress.   Abdominal: Soft. There is no tenderness.   Musculoskeletal: She exhibits no edema.   Neurological: She is alert.   Skin: Skin is warm and dry.    Psychiatric: She has a normal mood and affect.   Nursing note and vitals reviewed.      ECG 12 lead  Date/Time: 6/7/2019 10:00 PM  Performed by: BHAVYA WILKS  Authorized by: EMERGENCY, [employer]AGE [employer]     ECG reviewed by ED Physician in the absence of a cardiologist: yes    Previous ECG:     Previous ECG:  Compared to current    Similarity:  No change  Interpretation:     Interpretation: normal    Rate:     ECG rate:  62    ECG rate assessment: normal    Rhythm:     Rhythm: sinus rhythm    Ectopy:     Ectopy: none    QRS:     QRS axis:  Normal  Conduction:     Conduction: normal    ST segments:     ST segments:  Normal  T waves:     T waves: normal          ED Course as of Jhonny 10 1614   Fri Jun 07, 2019   2034 Impression: HTN  Plan: labs, CXR, EKG    [KL]   2054 Repeat VS improved, no hypoxia   [KL]   2109 Hydralazine ordered   [KL]   2148 SpO2: 97 % [KL]   Mon Jhonny 10, 2019   1613 Patient's blood pressure improved.  Will be discharged at this time continue with losartan 100 mg and 12.5 mg of metoprolol daily  [KL]      ED Course User Index  [KL] Bhavya Wilks PA C         Clinical Impressions as of Jhonny 10 1614   Essential hypertension       Final diagnoses:   None       Labs Reviewed   COMPREHENSIVE METABOLIC PANEL - Abnormal        Result Value    Sodium 137      Potassium 3.5 (*)     Chloride 104      CO2 25      BUN 16      Creatinine 0.8      Glucose 106 (*)     Calcium 9.5      AST (SGOT) 24      ALT (SGPT) 22      Alkaline Phosphatase 59      Total Protein 7.2      Albumin 4.8      Bilirubin, Total 0.8      eGFR >60.0      Anion Gap 8     DIFF COUNT - Abnormal     Differential Type Auto      nRBC 0.0      Immature Granulocytes 0.5      Neutrophils 74.6      Lymphocytes 18.3      Monocytes 4.8      Eosinophils 0.6      Basophils 1.2      Immature Granulocytes, Absolute 0.03      Neutrophils, Absolute 4.87      Lymphocytes, Absolute 1.19 (*)     Monocytes, Absolute 0.31      Eosinophils, Absolute  0.04      Basophils, Absolute 0.08     TROPONIN I - Normal    Troponin I 0.02     CBC - Normal    WBC 6.52      RBC 4.17      Hemoglobin 12.8      Hematocrit 38.7      MCV 92.8      MCH 30.7      MCHC 33.1      RDW 12.4      Platelets 222      MPV 9.5     CBC AND DIFFERENTIAL    Narrative:     The following orders were created for panel order CBC and differential.  Procedure                               Abnormality         Status                     ---------                               -----------         ------                     CBC[[id number]]                           Normal              Final result               Diff Count[[id number]]                    Abnormal            Final result                 Please view results for these tests on the individual orders.   RAINBOW DRAW PANEL    Narrative:     The following orders were created for panel order [place] Draw Panel.  Procedure                               Abnormality         Status                     ---------                               -----------         ------                     RAINBOW RED[[id number]]                                       In process                 RAINBOW LT BLUE[[id number]]                                   In process                 RAINBOW GOLD[[id number]]                                      In process                   Please view results for these tests on the individual orders.   RAINBOW RED   RAINBOW LT BLUE   RAINBOW GOLD       ECG 12 lead    (Results Pending)   X-RAY CHEST 1 VIEW    (Results Pending)          Judy Tavera PA C  06/10/19 7423

## 2019-06-10 LAB
ATRIAL RATE: 62
P AXIS: 58
PR INTERVAL: 180
QRS DURATION: 80
QT INTERVAL: 426
QTC CALCULATION(BAZETT): 432
R AXIS: 28
T WAVE AXIS: 23
VENTRICULAR RATE: 62

## 2019-07-10 ENCOUNTER — TELEPHONE (OUTPATIENT)
Dept: TRANSFER UNIT | Age: 69
End: 2019-07-10

## 2019-07-10 ENCOUNTER — TRANSCRIBE ORDERS (OUTPATIENT)
Dept: SCHEDULING | Age: 69
End: 2019-07-10

## 2019-07-10 DIAGNOSIS — I70.1 ATHEROSCLEROSIS OF RENAL ARTERY (CMS/HCC): Primary | ICD-10-CM

## 2019-07-10 RX ORDER — NEBIVOLOL 5 MG/1
5 TABLET ORAL NIGHTLY
COMMUNITY
End: 2019-10-02 | Stop reason: ALTCHOICE

## 2019-07-11 ENCOUNTER — HOSPITAL ENCOUNTER (OUTPATIENT)
Dept: RADIOLOGY | Age: 69
Discharge: HOME | End: 2019-07-11
Attending: INTERNAL MEDICINE
Payer: MEDICARE

## 2019-07-11 DIAGNOSIS — I70.1 ATHEROSCLEROSIS OF RENAL ARTERY (CMS/HCC): ICD-10-CM

## 2019-07-11 RX ORDER — GADOBUTROL 604.72 MG/ML
6 INJECTION INTRAVENOUS ONCE
Status: COMPLETED | OUTPATIENT
Start: 2019-07-11 | End: 2019-07-11

## 2019-07-11 RX ADMIN — GADOBUTROL 6 ML: 604.72 INJECTION INTRAVENOUS at 15:17

## 2019-08-01 ENCOUNTER — APPOINTMENT (OUTPATIENT)
Dept: LAB | Facility: HOSPITAL | Age: 69
End: 2019-08-01
Attending: INTERNAL MEDICINE
Payer: MEDICARE

## 2019-08-01 ENCOUNTER — TRANSCRIBE ORDERS (OUTPATIENT)
Dept: CARDIOLOGY | Facility: HOSPITAL | Age: 69
End: 2019-08-01

## 2019-08-01 DIAGNOSIS — I11.9 HYPERTENSIVE HEART DISEASE WITHOUT HEART FAILURE: ICD-10-CM

## 2019-08-01 DIAGNOSIS — I11.9 HYPERTENSIVE HEART DISEASE WITHOUT HEART FAILURE: Primary | ICD-10-CM

## 2019-08-01 LAB
ANION GAP SERPL CALC-SCNC: 10 MEQ/L (ref 3–15)
BUN SERPL-MCNC: 10 MG/DL (ref 8–20)
CALCIUM SERPL-MCNC: 10.1 MG/DL (ref 8.9–10.3)
CHLORIDE SERPL-SCNC: 105 MEQ/L (ref 98–109)
CO2 SERPL-SCNC: 26 MEQ/L (ref 22–32)
CREAT SERPL-MCNC: 0.8 MG/DL
GFR SERPL CREATININE-BSD FRML MDRD: >60 ML/MIN/1.73M*2
GLUCOSE SERPL-MCNC: 97 MG/DL (ref 70–99)
POTASSIUM SERPL-SCNC: 3.5 MEQ/L (ref 3.6–5.1)
SODIUM SERPL-SCNC: 141 MEQ/L (ref 136–144)

## 2019-08-01 PROCEDURE — 36415 COLL VENOUS BLD VENIPUNCTURE: CPT

## 2019-08-01 PROCEDURE — 82310 ASSAY OF CALCIUM: CPT

## 2019-08-14 ENCOUNTER — HOSPITAL ENCOUNTER (OUTPATIENT)
Dept: RADIOLOGY | Facility: HOSPITAL | Age: 69
Discharge: HOME | End: 2019-08-14
Attending: FAMILY MEDICINE
Payer: MEDICARE

## 2019-08-14 ENCOUNTER — APPOINTMENT (OUTPATIENT)
Dept: LAB | Facility: HOSPITAL | Age: 69
End: 2019-08-14
Attending: FAMILY MEDICINE
Payer: MEDICARE

## 2019-08-14 ENCOUNTER — TRANSCRIBE ORDERS (OUTPATIENT)
Dept: REGISTRATION | Facility: HOSPITAL | Age: 69
End: 2019-08-14

## 2019-08-14 ENCOUNTER — TRANSCRIBE ORDERS (OUTPATIENT)
Dept: SCHEDULING | Age: 69
End: 2019-08-14

## 2019-08-14 DIAGNOSIS — E87.6 HYPOKALEMIA: ICD-10-CM

## 2019-08-14 DIAGNOSIS — E87.1 HYPO-OSMOLALITY AND HYPONATREMIA: ICD-10-CM

## 2019-08-14 DIAGNOSIS — R11.0 NAUSEA: ICD-10-CM

## 2019-08-14 DIAGNOSIS — R11.0 NAUSEA: Primary | ICD-10-CM

## 2019-08-14 DIAGNOSIS — E83.42 HYPOMAGNESEMIA: ICD-10-CM

## 2019-08-14 DIAGNOSIS — E87.1 HYPO-OSMOLALITY AND HYPONATREMIA: Primary | ICD-10-CM

## 2019-08-14 LAB
MAGNESIUM SERPL-MCNC: 2.1 MG/DL (ref 1.8–2.5)
POTASSIUM SERPL-SCNC: 4.1 MEQ/L (ref 3.6–5.1)
SODIUM SERPL-SCNC: 129 MEQ/L (ref 136–144)

## 2019-08-14 PROCEDURE — 84132 ASSAY OF SERUM POTASSIUM: CPT

## 2019-08-14 PROCEDURE — 84295 ASSAY OF SERUM SODIUM: CPT

## 2019-08-14 PROCEDURE — 36415 COLL VENOUS BLD VENIPUNCTURE: CPT

## 2019-08-14 PROCEDURE — 83735 ASSAY OF MAGNESIUM: CPT

## 2019-08-14 PROCEDURE — 76700 US EXAM ABDOM COMPLETE: CPT

## 2019-08-19 ENCOUNTER — TRANSCRIBE ORDERS (OUTPATIENT)
Dept: REGISTRATION | Facility: HOSPITAL | Age: 69
End: 2019-08-19

## 2019-08-19 ENCOUNTER — APPOINTMENT (OUTPATIENT)
Dept: LAB | Facility: HOSPITAL | Age: 69
End: 2019-08-19
Attending: PHYSICIAN ASSISTANT
Payer: MEDICARE

## 2019-08-19 DIAGNOSIS — E87.1 HYPO-OSMOLALITY AND HYPONATREMIA: ICD-10-CM

## 2019-08-19 DIAGNOSIS — E83.42 HYPOMAGNESEMIA: ICD-10-CM

## 2019-08-19 DIAGNOSIS — E87.6 HYPOKALEMIA: Primary | ICD-10-CM

## 2019-08-19 DIAGNOSIS — E87.6 HYPOKALEMIA: ICD-10-CM

## 2019-08-19 LAB
MAGNESIUM SERPL-MCNC: 2 MG/DL (ref 1.8–2.5)
POTASSIUM SERPL-SCNC: 3.8 MEQ/L (ref 3.6–5.1)
SODIUM SERPL-SCNC: 139 MEQ/L (ref 136–144)

## 2019-08-19 PROCEDURE — 84295 ASSAY OF SERUM SODIUM: CPT

## 2019-08-19 PROCEDURE — 84132 ASSAY OF SERUM POTASSIUM: CPT

## 2019-08-19 PROCEDURE — 36415 COLL VENOUS BLD VENIPUNCTURE: CPT

## 2019-08-19 PROCEDURE — 83735 ASSAY OF MAGNESIUM: CPT

## 2019-08-22 ENCOUNTER — TRANSCRIBE ORDERS (OUTPATIENT)
Dept: LAB | Facility: HOSPITAL | Age: 69
End: 2019-08-22

## 2019-08-22 ENCOUNTER — APPOINTMENT (OUTPATIENT)
Dept: LAB | Facility: HOSPITAL | Age: 69
End: 2019-08-22
Attending: PHYSICIAN ASSISTANT
Payer: MEDICARE

## 2019-08-22 DIAGNOSIS — E87.1 HYPO-OSMOLALITY AND HYPONATREMIA: Primary | ICD-10-CM

## 2019-08-22 DIAGNOSIS — E83.42 HYPOMAGNESEMIA: ICD-10-CM

## 2019-08-22 DIAGNOSIS — E87.1 HYPO-OSMOLALITY AND HYPONATREMIA: ICD-10-CM

## 2019-08-22 DIAGNOSIS — I11.9 HYPERTENSIVE HEART DISEASE WITHOUT HEART FAILURE: ICD-10-CM

## 2019-08-22 DIAGNOSIS — I10 ESSENTIAL (PRIMARY) HYPERTENSION: ICD-10-CM

## 2019-08-22 LAB
ANION GAP SERPL CALC-SCNC: 6 MEQ/L (ref 3–15)
BUN SERPL-MCNC: 15 MG/DL (ref 8–20)
CALCIUM SERPL-MCNC: 9.7 MG/DL (ref 8.9–10.3)
CHLORIDE SERPL-SCNC: 109 MEQ/L (ref 98–109)
CO2 SERPL-SCNC: 28 MEQ/L (ref 22–32)
CREAT SERPL-MCNC: 0.9 MG/DL
GFR SERPL CREATININE-BSD FRML MDRD: >60 ML/MIN/1.73M*2
GLUCOSE SERPL-MCNC: 94 MG/DL (ref 70–99)
MAGNESIUM SERPL-MCNC: 2.1 MG/DL (ref 1.8–2.5)
POTASSIUM SERPL-SCNC: 4.4 MEQ/L (ref 3.6–5.1)
SODIUM SERPL-SCNC: 143 MEQ/L (ref 136–144)

## 2019-08-22 PROCEDURE — 83735 ASSAY OF MAGNESIUM: CPT

## 2019-08-22 PROCEDURE — 36415 COLL VENOUS BLD VENIPUNCTURE: CPT

## 2019-08-22 PROCEDURE — 80048 BASIC METABOLIC PNL TOTAL CA: CPT

## 2019-08-29 ENCOUNTER — APPOINTMENT (OUTPATIENT)
Dept: LAB | Facility: HOSPITAL | Age: 69
End: 2019-08-29
Attending: PHYSICIAN ASSISTANT
Payer: MEDICARE

## 2019-08-29 DIAGNOSIS — E83.42 HYPOMAGNESEMIA: ICD-10-CM

## 2019-08-29 DIAGNOSIS — E87.1 HYPO-OSMOLALITY AND HYPONATREMIA: ICD-10-CM

## 2019-08-29 DIAGNOSIS — I10 ESSENTIAL (PRIMARY) HYPERTENSION: ICD-10-CM

## 2019-08-29 LAB
ALBUMIN SERPL-MCNC: 4.2 G/DL (ref 3.4–5)
ALP SERPL-CCNC: 49 IU/L (ref 35–126)
ALT SERPL-CCNC: 21 IU/L (ref 11–54)
ANION GAP SERPL CALC-SCNC: 9 MEQ/L (ref 3–15)
AST SERPL-CCNC: 20 IU/L (ref 15–41)
BILIRUB SERPL-MCNC: 1 MG/DL (ref 0.3–1.2)
BUN SERPL-MCNC: 10 MG/DL (ref 8–20)
CALCIUM SERPL-MCNC: 9.6 MG/DL (ref 8.9–10.3)
CHLORIDE SERPL-SCNC: 106 MEQ/L (ref 98–109)
CO2 SERPL-SCNC: 25 MEQ/L (ref 22–32)
CREAT SERPL-MCNC: 0.9 MG/DL
GFR SERPL CREATININE-BSD FRML MDRD: >60 ML/MIN/1.73M*2
GLUCOSE SERPL-MCNC: 93 MG/DL (ref 70–99)
MAGNESIUM SERPL-MCNC: 2 MG/DL (ref 1.8–2.5)
POTASSIUM SERPL-SCNC: 3.8 MEQ/L (ref 3.6–5.1)
PROT SERPL-MCNC: 6.1 G/DL (ref 6–8.2)
SODIUM SERPL-SCNC: 140 MEQ/L (ref 136–144)

## 2019-08-29 PROCEDURE — 83735 ASSAY OF MAGNESIUM: CPT

## 2019-08-29 PROCEDURE — 80053 COMPREHEN METABOLIC PANEL: CPT

## 2019-08-29 PROCEDURE — 36415 COLL VENOUS BLD VENIPUNCTURE: CPT

## 2019-09-13 ENCOUNTER — APPOINTMENT (OUTPATIENT)
Dept: LAB | Facility: HOSPITAL | Age: 69
End: 2019-09-13
Attending: FAMILY MEDICINE
Payer: MEDICARE

## 2019-09-13 ENCOUNTER — TRANSCRIBE ORDERS (OUTPATIENT)
Dept: REGISTRATION | Facility: HOSPITAL | Age: 69
End: 2019-09-13

## 2019-09-13 DIAGNOSIS — E83.42 HYPOMAGNESEMIA: ICD-10-CM

## 2019-09-13 DIAGNOSIS — E87.1 HYPO-OSMOLALITY AND HYPONATREMIA: ICD-10-CM

## 2019-09-13 DIAGNOSIS — E87.1 HYPO-OSMOLALITY AND HYPONATREMIA: Primary | ICD-10-CM

## 2019-09-13 LAB
ALBUMIN SERPL-MCNC: 4.3 G/DL (ref 3.4–5)
ALP SERPL-CCNC: 51 IU/L (ref 35–126)
ALT SERPL-CCNC: 21 IU/L (ref 11–54)
ANION GAP SERPL CALC-SCNC: 12 MEQ/L (ref 3–15)
AST SERPL-CCNC: 20 IU/L (ref 15–41)
BILIRUB SERPL-MCNC: 1.1 MG/DL (ref 0.3–1.2)
BUN SERPL-MCNC: 14 MG/DL (ref 8–20)
CALCIUM SERPL-MCNC: 9.6 MG/DL (ref 8.9–10.3)
CHLORIDE SERPL-SCNC: 100 MEQ/L (ref 98–109)
CO2 SERPL-SCNC: 23 MEQ/L (ref 22–32)
CREAT SERPL-MCNC: 0.8 MG/DL
GFR SERPL CREATININE-BSD FRML MDRD: >60 ML/MIN/1.73M*2
GLUCOSE SERPL-MCNC: 103 MG/DL (ref 70–99)
MAGNESIUM SERPL-MCNC: 2 MG/DL (ref 1.8–2.5)
POTASSIUM SERPL-SCNC: 3.9 MEQ/L (ref 3.6–5.1)
PROT SERPL-MCNC: 6.6 G/DL (ref 6–8.2)
SODIUM SERPL-SCNC: 135 MEQ/L (ref 136–144)

## 2019-09-13 PROCEDURE — 36415 COLL VENOUS BLD VENIPUNCTURE: CPT

## 2019-09-13 PROCEDURE — 80053 COMPREHEN METABOLIC PANEL: CPT

## 2019-09-13 PROCEDURE — 83735 ASSAY OF MAGNESIUM: CPT

## 2019-09-22 ENCOUNTER — APPOINTMENT (EMERGENCY)
Dept: RADIOLOGY | Facility: HOSPITAL | Age: 69
End: 2019-09-22
Attending: EMERGENCY MEDICINE
Payer: MEDICARE

## 2019-09-22 ENCOUNTER — HOSPITAL ENCOUNTER (EMERGENCY)
Facility: HOSPITAL | Age: 69
Discharge: HOME | End: 2019-09-22
Attending: EMERGENCY MEDICINE
Payer: MEDICARE

## 2019-09-22 VITALS
TEMPERATURE: 99 F | WEIGHT: 108 LBS | BODY MASS INDEX: 21.2 KG/M2 | DIASTOLIC BLOOD PRESSURE: 89 MMHG | RESPIRATION RATE: 16 BRPM | HEART RATE: 66 BPM | HEIGHT: 60 IN | OXYGEN SATURATION: 99 % | SYSTOLIC BLOOD PRESSURE: 188 MMHG

## 2019-09-22 DIAGNOSIS — E87.6 HYPOKALEMIA: ICD-10-CM

## 2019-09-22 DIAGNOSIS — R53.1 GENERALIZED WEAKNESS: Primary | ICD-10-CM

## 2019-09-22 LAB
ALBUMIN SERPL-MCNC: 4.4 G/DL (ref 3.4–5)
ALP SERPL-CCNC: 56 IU/L (ref 35–126)
ALT SERPL-CCNC: 24 IU/L (ref 11–54)
ANION GAP SERPL CALC-SCNC: 11 MEQ/L (ref 3–15)
AST SERPL-CCNC: 23 IU/L (ref 15–41)
BASOPHILS # BLD: 0.06 K/UL (ref 0.01–0.1)
BASOPHILS NFR BLD: 1 %
BILIRUB SERPL-MCNC: 0.8 MG/DL (ref 0.3–1.2)
BILIRUB UR QL STRIP.AUTO: NEGATIVE MG/DL
BUN SERPL-MCNC: 14 MG/DL (ref 8–20)
CALCIUM SERPL-MCNC: 9.5 MG/DL (ref 8.9–10.3)
CHLORIDE SERPL-SCNC: 105 MEQ/L (ref 98–109)
CLARITY UR REFRACT.AUTO: CLEAR
CO2 SERPL-SCNC: 23 MEQ/L (ref 22–32)
COLOR UR AUTO: YELLOW
CREAT SERPL-MCNC: 0.8 MG/DL
DIFFERENTIAL METHOD BLD: ABNORMAL
EOSINOPHIL # BLD: 0.02 K/UL (ref 0.04–0.36)
EOSINOPHIL NFR BLD: 0.3 %
ERYTHROCYTE [DISTWIDTH] IN BLOOD BY AUTOMATED COUNT: 12.5 % (ref 11.7–14.4)
GFR SERPL CREATININE-BSD FRML MDRD: >60 ML/MIN/1.73M*2
GLUCOSE SERPL-MCNC: 100 MG/DL (ref 70–99)
GLUCOSE UR STRIP.AUTO-MCNC: NEGATIVE MG/DL
HCT VFR BLDCO AUTO: 37.9 %
HGB BLD-MCNC: 13.4 G/DL
HGB UR QL STRIP.AUTO: NEGATIVE
IMM GRANULOCYTES # BLD AUTO: 0.01 K/UL (ref 0–0.08)
IMM GRANULOCYTES NFR BLD AUTO: 0.2 %
KETONES UR STRIP.AUTO-MCNC: NEGATIVE MG/DL
LEUKOCYTE ESTERASE UR QL STRIP.AUTO: NEGATIVE
LYMPHOCYTES # BLD: 1.08 K/UL (ref 1.2–3.5)
LYMPHOCYTES NFR BLD: 17.6 %
MAGNESIUM SERPL-MCNC: 2.2 MG/DL (ref 1.8–2.5)
MCH RBC QN AUTO: 31.3 PG (ref 28–33.2)
MCHC RBC AUTO-ENTMCNC: 35.4 G/DL (ref 32.2–35.5)
MCV RBC AUTO: 88.6 FL (ref 83–98)
MONOCYTES # BLD: 0.24 K/UL (ref 0.28–0.8)
MONOCYTES NFR BLD: 3.9 %
NEUTROPHILS # BLD: 4.74 K/UL (ref 1.7–7)
NEUTS SEG NFR BLD: 77 %
NITRITE UR QL STRIP.AUTO: NEGATIVE
NRBC BLD-RTO: 0 %
PDW BLD AUTO: 9 FL (ref 9.4–12.3)
PH UR STRIP.AUTO: 7.5 [PH]
PLATELET # BLD AUTO: 234 K/UL
POTASSIUM SERPL-SCNC: 3.2 MEQ/L (ref 3.6–5.1)
PROT SERPL-MCNC: 7.1 G/DL (ref 6–8.2)
PROT UR QL STRIP.AUTO: NEGATIVE
RBC # BLD AUTO: 4.28 M/UL (ref 3.93–5.22)
SODIUM SERPL-SCNC: 139 MEQ/L (ref 136–144)
SP GR UR REFRACT.AUTO: 1.01
TSH SERPL DL<=0.05 MIU/L-ACNC: 0.97 MIU/L (ref 0.34–5.6)
UROBILINOGEN UR STRIP-ACNC: 0.2 EU/DL
WBC # BLD AUTO: 6.15 K/UL

## 2019-09-22 PROCEDURE — 84443 ASSAY THYROID STIM HORMONE: CPT | Performed by: PHYSICIAN ASSISTANT

## 2019-09-22 PROCEDURE — 63700000 HC SELF-ADMINISTRABLE DRUG: Performed by: PHYSICIAN ASSISTANT

## 2019-09-22 PROCEDURE — 85025 COMPLETE CBC W/AUTO DIFF WBC: CPT | Performed by: PHYSICIAN ASSISTANT

## 2019-09-22 PROCEDURE — 81003 URINALYSIS AUTO W/O SCOPE: CPT | Performed by: PHYSICIAN ASSISTANT

## 2019-09-22 PROCEDURE — 71046 X-RAY EXAM CHEST 2 VIEWS: CPT

## 2019-09-22 PROCEDURE — 93005 ELECTROCARDIOGRAM TRACING: CPT | Performed by: EMERGENCY MEDICINE

## 2019-09-22 PROCEDURE — 99283 EMERGENCY DEPT VISIT LOW MDM: CPT | Mod: 25

## 2019-09-22 PROCEDURE — 36415 COLL VENOUS BLD VENIPUNCTURE: CPT | Performed by: PHYSICIAN ASSISTANT

## 2019-09-22 PROCEDURE — 80053 COMPREHEN METABOLIC PANEL: CPT | Performed by: PHYSICIAN ASSISTANT

## 2019-09-22 PROCEDURE — 83735 ASSAY OF MAGNESIUM: CPT | Performed by: PHYSICIAN ASSISTANT

## 2019-09-22 RX ORDER — CLONIDINE 0.1 MG/24H
1 PATCH, EXTENDED RELEASE TRANSDERMAL WEEKLY
COMMUNITY
End: 2019-10-02 | Stop reason: ENTERED-IN-ERROR

## 2019-09-22 RX ORDER — SERTRALINE HYDROCHLORIDE 25 MG/1
25 TABLET, FILM COATED ORAL DAILY
COMMUNITY

## 2019-09-22 RX ORDER — POTASSIUM CHLORIDE 750 MG/1
40 TABLET, FILM COATED, EXTENDED RELEASE ORAL ONCE
Status: COMPLETED | OUTPATIENT
Start: 2019-09-22 | End: 2019-09-22

## 2019-09-22 RX ADMIN — POTASSIUM CHLORIDE 40 MEQ: 10 TABLET, EXTENDED RELEASE ORAL at 19:50

## 2019-09-22 ASSESSMENT — ENCOUNTER SYMPTOMS
SEIZURES: 0
ABDOMINAL PAIN: 0
ACTIVITY CHANGE: 0
SPEECH DIFFICULTY: 0
FACIAL SWELLING: 0
COLOR CHANGE: 0
LIGHT-HEADEDNESS: 1
DIARRHEA: 0
HEMATURIA: 0
SORE THROAT: 0
WHEEZING: 0
NAUSEA: 0
BACK PAIN: 0
DIZZINESS: 0
HEADACHES: 0
WEAKNESS: 1
VOMITING: 0
AGITATION: 0
NECK PAIN: 0
COUGH: 0
FEVER: 0
DIFFICULTY URINATING: 0
DIAPHORESIS: 0
SHORTNESS OF BREATH: 0

## 2019-09-22 NOTE — ED PROVIDER NOTES
HPI   Patient is a 68-year-old female with past history of hypertension, hypothyroid, seizures, anxiety/depression who presents the ED for evaluation of generalized weakness.  Patient states over the last month she has felt intermittently weak, decreased appetite and dizzy.  She also reports 12 pound weight loss since July.  Had recent CAT scan of her abdomen and pelvis August 2019 that was unremarkable. Notes her psychiatrist recently switched her Prozac to Zoloft 4 days ago.  States today she woke up feeling lightheaded and weak, worse than usual. Admits to not eating much yesterday. History of hypokalemia, hyponatremia in the past and was concerned that it was causing her weakness today.   Denies CP, palpitations, SOB, syncope/near syncope, cough, congestion, nausea/vomiting/diarrhea, abdominal pain, hematuria, dysuria, frequency.               Chief Complaint   Patient presents with   • Dizziness   • Weakness - Generalized       HPI     Patient History     Past Medical History:   Diagnosis Date   • Anxiety    • Cataract    • Diverticulitis of colon    • Fibromuscular dysplasia (CMS/HCC) (HCC)    • Glaucoma suspect of both eyes    • History of colostomy reversal    • Hypertension    • Hypothyroidism    • Numbness and tingling of right hand    • Perforated bowel (CMS/HCC) (HCC)    • Seizures (CMS/HCC) (HCC) 04/07/2019   • Umbilical hernia        Past Surgical History:   Procedure Laterality Date   • COLON SURGERY  10/28/2013    Laparoscopic, (single port) resection of sigmoid colostomy, repair hernia, resection portion of sigmoid colon and rectum   • COLON SURGERY  05/21/2014    Colonoscopy to the transverse colon. Poor prep   • COLON SURGERY  04/27/2016    Colonoscopy to base of the cecum   • HERNIA REPAIR     • LAPAROTOMY SALPINGO OOPHORECTOMY Left        Family History   Problem Relation Age of Onset   • Heart disease Biological Mother    • Pulmonary fibrosis Biological Father        Social History   Substance  Use Topics   • Smoking status: Former Smoker     Packs/day: 1.00     Years: 15.00     Types: Cigarettes   • Smokeless tobacco: Never Used      Comment: quit 35 years ago   • Alcohol use 4.2 oz/week     7 Glasses of wine per week      Comment: occasionally        Systems Reviewed from Nursing Triage:          Review of Systems     Review of Systems   Constitutional: Positive for appetite change and unexpected weight change. Negative for activity change, diaphoresis and fever.   HENT: Negative for facial swelling and sore throat.    Eyes: Negative for visual disturbance.   Respiratory: Negative for cough, shortness of breath and wheezing.    Cardiovascular: Negative for chest pain and leg swelling.   Gastrointestinal: Negative for abdominal pain, diarrhea, nausea and vomiting.   Genitourinary: Negative for difficulty urinating and hematuria.   Musculoskeletal: Negative for back pain and neck pain.   Skin: Negative for color change and pallor.   Neurological: Positive for weakness and light-headedness. Negative for dizziness, seizures, syncope, speech difficulty and headaches.   Psychiatric/Behavioral: Negative for agitation and behavioral problems.   All other systems reviewed and are negative.       Physical Exam     ED Triage Vitals [09/22/19 1728]   Temp Heart Rate Resp BP SpO2   37.2 °C (99 °F) 83 20 (!) 175/84 100 %      Temp Source Heart Rate Source Patient Position BP Location FiO2 (%) (Set)   Temporal -- Sitting Left upper arm --                     Patient Vitals for the past 24 hrs:   BP Temp Temp src Pulse Resp SpO2 Height Weight   09/22/19 1728 (!) 175/84 37.2 °C (99 °F) Temporal 83 20 100 % 1.524 m (5') 49 kg (108 lb)           Physical Exam   Constitutional: She appears well-developed.   HENT:   Head: Normocephalic and atraumatic.   Eyes: Conjunctivae are normal.   Neck: Normal range of motion.   Cardiovascular: Normal rate, regular rhythm and intact distal pulses.    Pulmonary/Chest: Effort normal and  breath sounds normal.   Abdominal: Soft. She exhibits no distension and no mass. There is no tenderness.   Musculoskeletal: Normal range of motion. She exhibits no tenderness or deformity.   Neurological: She is alert. No cranial nerve deficit.   CN 2-12 grossly intact, Strength 5/5 in bilat upper and lower extremities, sensation equal and discernable over bilat upper and lower extremities. There are no abnormal or extraneous movements. Posture is normal. Gait is steady.   Skin: Skin is warm and dry.   Psychiatric: She has a normal mood and affect. Her behavior is normal.   Nursing note and vitals reviewed.           Procedures    ED Course & MDM     Labs Reviewed   CBC - Abnormal        Result Value    WBC 6.15      RBC 4.28      Hemoglobin 13.4      Hematocrit 37.9      MCV 88.6      MCH 31.3      MCHC 35.4      RDW 12.5      Platelets 234      MPV 9.0 (*)    DIFF COUNT - Abnormal     Differential Type Auto      nRBC 0.0      Immature Granulocytes 0.2      Neutrophils 77.0      Lymphocytes 17.6      Monocytes 3.9      Eosinophils 0.3      Basophils 1.0      Immature Granulocytes, Absolute 0.01      Neutrophils, Absolute 4.74      Lymphocytes, Absolute 1.08 (*)     Monocytes, Absolute 0.24 (*)     Eosinophils, Absolute 0.02 (*)     Basophils, Absolute 0.06     CBC AND DIFFERENTIAL    Narrative:     The following orders were created for panel order CBC and differential.  Procedure                               Abnormality         Status                     ---------                               -----------         ------                     CBC[[id number]]                           Abnormal            Final result               Diff Count[[id number]]                    Abnormal            Final result                 Please view results for these tests on the individual orders.   COMPREHENSIVE METABOLIC PANEL   RAINBOW DRAW PANEL    Narrative:     The following orders were created for panel order [place] Draw  Panel.  Procedure                               Abnormality         Status                     ---------                               -----------         ------                     RAINBOW RED[[id number]]                                       In process                 RAINBOW LT BLUE[[id number]]                                   In process                 RAINBOW LT GREEN[[id number]]                                  In process                 RAINBOW GOLD[[id number]]                                      In process                   Please view results for these tests on the individual orders.   MAGNESIUM   RAINBOW RED   RAINBOW LT BLUE   RAINBOW LT GREEN   RAINBOW GOLD       No orders to display               MDM         ED Course as of Sep 23 1441   Sun Sep 22, 2019   1846 Impression: 69 yo F w/ generalized weakness  Plan: labs, CXR, UA  [JV]   1952 Labs unremarkable with the exception of potassium 3.2.  We gave her oral potassium here in the ED.  Her symptoms are possibly secondary to a medication side effect due to recent medication change.  Her neuro exam was benign, no focal deficits.  Pt ambulated throughout ED x2 to use the restroom. I updated her with results thus far and even offered her admission however patient declined stating she feels well enough to go home.   [JV]      ED Course User Index  [JV] Komal Jones PA C         Clinical Impressions as of Sep 23 1441   Generalized weakness   Hypokalemia        Komal Jones PA C  09/23/19 1500

## 2019-09-22 NOTE — ED ATTESTATION NOTE
I have personally seen and examined the patient.  I reviewed and agree with physician assistant / nurse practitioner’s assessment and plan of care, with the following exceptions: None  My examination, assessment, and plan of care of Randi Saul is as follows:    68-year-old female with past medical history of hypertension, hypothyroidism, anxiety, seizures, who presents to the emergency department for evaluation of generalized weakness and lightheadedness.  The patient reports that she has had feeling generalized weakness and lightheaded for the past several days but symptoms were worse this morning.  Patient reports she has had recent weight loss, dry mouth, increased urination.  The patient was recently changed from Prozac to Zoloft 5 days ago.  The patient does report history of prior hyponatremia and hypokalemia.  Patient denies fever, chills, cough, chest pain, dyspnea, abdominal pain, diarrhea, constipation, lower extremity pain, edema, or rash.  The patient does have lightheadedness with sensation of being off balance.  The patient denies focal neurologic weakness, numbness, vision change or speech change.    Physical exam: Vital signs reviewed.  HEENT: NCAT, EOMI, MMM.  Neck: Supple, nontender, full range of motion, no JVD.  Chest: Lungs clear to auscultation bilaterally, no rales.  Heart: Regular rate and rhythm no murmur.  Abdomen: Soft, nontender, nondistended, no guarding, rebound.  Extremities: No cyanosis, clubbing, edema, or calf tenderness.  Skin: Warm and dry, no rash.  Neuro: GCS 15.  5/5 strength bilateral upper and lower extremities.  Sensation normal, cranial nerves II to XII intact.  Normal muscle tone.    Plan: Check labs.    Labs Reviewed   COMPREHENSIVE METABOLIC PANEL - Abnormal        Result Value    Sodium 139      Potassium 3.2 (*)     Chloride 105      CO2 23      BUN 14      Creatinine 0.8      Glucose 100 (*)     Calcium 9.5      AST (SGOT) 23      ALT (SGPT) 24      Alkaline  Phosphatase 56      Total Protein 7.1      Albumin 4.4      Bilirubin, Total 0.8      eGFR >60.0      Anion Gap 11     CBC - Abnormal     WBC 6.15      RBC 4.28      Hemoglobin 13.4      Hematocrit 37.9      MCV 88.6      MCH 31.3      MCHC 35.4      RDW 12.5      Platelets 234      MPV 9.0 (*)    DIFF COUNT - Abnormal     Differential Type Auto      nRBC 0.0      Immature Granulocytes 0.2      Neutrophils 77.0      Lymphocytes 17.6      Monocytes 3.9      Eosinophils 0.3      Basophils 1.0      Immature Granulocytes, Absolute 0.01      Neutrophils, Absolute 4.74      Lymphocytes, Absolute 1.08 (*)     Monocytes, Absolute 0.24 (*)     Eosinophils, Absolute 0.02 (*)     Basophils, Absolute 0.06     MAGNESIUM - Normal    Magnesium 2.2     TSH 3RD GENERATION W/REFLEX FT4 - Normal    TSH 0.97     CBC AND DIFFERENTIAL    Narrative:     The following orders were created for panel order CBC and differential.  Procedure                               Abnormality         Status                     ---------                               -----------         ------                     CBC[[id number]]                           Abnormal            Final result               Diff Count[[id number]]                    Abnormal            Final result                 Please view results for these tests on the individual orders.   RAINBOW DRAW PANEL    Narrative:     The following orders were created for panel order [place] Draw Panel.  Procedure                               Abnormality         Status                     ---------                               -----------         ------                     RAINBOW RED[[id number]]                                       In process                 RAINBOW LT BLUE[[id number]]                                   In process                 RAINBOW LT GREEN[[id number]]                                  In process                 RAINBOW GOLD[[id number]]                                      In process                    Please view results for these tests on the individual orders.   URINALYSIS REFLEX CULTURE (ED AND OUTPATIENT ONLY)    Narrative:     The following orders were created for panel order Urinalysis w/ reflex culture.  Procedure                               Abnormality         Status                     ---------                               -----------         ------                     UA Reflex to Culture (Mac...[[id number]]                                                   Please view results for these tests on the individual orders.   UA REFLEX CULTURE (MACROSCOPIC)   RAINBOW RED   RAINBOW LT BLUE   RAINBOW LT GREEN   RAINBOW GOLD     Possible medication side effect.  No evidence of severe hyponatremia.  Mild hypokalemia.  No evidence of acute CVA.    Impression: Acute generalized weakness, fatigue        I was physically present for the key/critical portions of the following procedures: None       Peter Rodriguez MD  09/22/19 9596

## 2019-09-22 NOTE — DISCHARGE INSTRUCTIONS
You were seen here today for generalized weakness.   Your labs showed a low potassium level.   We gave you oral potassium here.  Please see attached list for potassium rich foods and follow up with your PCP regarding possible outpatient management of this.    Should you have any new or concerning symptoms return to the ED.  As discussed, we recommend you try to increase your oral intake.

## 2019-09-23 LAB
ATRIAL RATE: 63
P AXIS: 0
PR INTERVAL: 172
QRS DURATION: 82
QT INTERVAL: 428
QTC CALCULATION(BAZETT): 437
R AXIS: -10
T WAVE AXIS: 32
VENTRICULAR RATE: 63

## 2019-09-23 ASSESSMENT — ENCOUNTER SYMPTOMS
APPETITE CHANGE: 1
UNEXPECTED WEIGHT CHANGE: 1

## 2019-09-24 ENCOUNTER — APPOINTMENT (OUTPATIENT)
Dept: LAB | Facility: HOSPITAL | Age: 69
End: 2019-09-24
Attending: FAMILY MEDICINE
Payer: MEDICARE

## 2019-09-24 ENCOUNTER — TRANSCRIBE ORDERS (OUTPATIENT)
Dept: REGISTRATION | Facility: HOSPITAL | Age: 69
End: 2019-09-24

## 2019-09-24 DIAGNOSIS — E87.6 HYPOKALEMIA: ICD-10-CM

## 2019-09-24 DIAGNOSIS — E87.6 HYPOKALEMIA: Primary | ICD-10-CM

## 2019-09-24 LAB
ANION GAP SERPL CALC-SCNC: 8 MEQ/L (ref 3–15)
BUN SERPL-MCNC: 14 MG/DL (ref 8–20)
CALCIUM SERPL-MCNC: 9.6 MG/DL (ref 8.9–10.3)
CHLORIDE SERPL-SCNC: 105 MEQ/L (ref 98–109)
CO2 SERPL-SCNC: 23 MEQ/L (ref 22–32)
CREAT SERPL-MCNC: 0.9 MG/DL
GFR SERPL CREATININE-BSD FRML MDRD: >60 ML/MIN/1.73M*2
GLUCOSE SERPL-MCNC: 85 MG/DL (ref 70–99)
POTASSIUM SERPL-SCNC: 4.2 MEQ/L (ref 3.6–5.1)
SODIUM SERPL-SCNC: 136 MEQ/L (ref 136–144)

## 2019-09-24 PROCEDURE — 36415 COLL VENOUS BLD VENIPUNCTURE: CPT

## 2019-09-24 PROCEDURE — 80048 BASIC METABOLIC PNL TOTAL CA: CPT

## 2019-10-02 ENCOUNTER — TRANSCRIBE ORDERS (OUTPATIENT)
Dept: SCHEDULING | Age: 69
End: 2019-10-02

## 2019-10-02 DIAGNOSIS — R68.89 OTHER GENERAL SYMPTOMS AND SIGNS: Primary | ICD-10-CM

## 2019-10-03 ENCOUNTER — TRANSCRIBE ORDERS (OUTPATIENT)
Dept: LAB | Facility: HOSPITAL | Age: 69
End: 2019-10-03

## 2019-10-03 ENCOUNTER — APPOINTMENT (OUTPATIENT)
Dept: LAB | Facility: HOSPITAL | Age: 69
End: 2019-10-03
Attending: FAMILY MEDICINE
Payer: MEDICARE

## 2019-10-03 DIAGNOSIS — E87.1 HYPO-OSMOLALITY AND HYPONATREMIA: ICD-10-CM

## 2019-10-03 DIAGNOSIS — E87.6 HYPOKALEMIA: ICD-10-CM

## 2019-10-03 DIAGNOSIS — E83.42 HYPOMAGNESEMIA: ICD-10-CM

## 2019-10-03 DIAGNOSIS — E83.42 HYPOMAGNESEMIA: Primary | ICD-10-CM

## 2019-10-03 LAB
ALBUMIN SERPL-MCNC: 4.2 G/DL (ref 3.4–5)
ALP SERPL-CCNC: 56 IU/L (ref 35–126)
ALT SERPL-CCNC: 18 IU/L (ref 11–54)
ANION GAP SERPL CALC-SCNC: 9 MEQ/L (ref 3–15)
AST SERPL-CCNC: 22 IU/L (ref 15–41)
BILIRUB SERPL-MCNC: 0.9 MG/DL (ref 0.3–1.2)
BUN SERPL-MCNC: 14 MG/DL (ref 8–20)
CALCIUM SERPL-MCNC: 9.5 MG/DL (ref 8.9–10.3)
CHLORIDE SERPL-SCNC: 102 MEQ/L (ref 98–109)
CO2 SERPL-SCNC: 24 MEQ/L (ref 22–32)
CREAT SERPL-MCNC: 0.8 MG/DL
GFR SERPL CREATININE-BSD FRML MDRD: >60 ML/MIN/1.73M*2
GLUCOSE SERPL-MCNC: 97 MG/DL (ref 70–99)
MAGNESIUM SERPL-MCNC: 2 MG/DL (ref 1.8–2.5)
POTASSIUM SERPL-SCNC: 4 MEQ/L (ref 3.6–5.1)
PROT SERPL-MCNC: 6.4 G/DL (ref 6–8.2)
SODIUM SERPL-SCNC: 135 MEQ/L (ref 136–144)

## 2019-10-03 PROCEDURE — 36415 COLL VENOUS BLD VENIPUNCTURE: CPT

## 2019-10-03 PROCEDURE — 83735 ASSAY OF MAGNESIUM: CPT

## 2019-10-03 PROCEDURE — 80053 COMPREHEN METABOLIC PANEL: CPT

## 2019-10-04 ENCOUNTER — HOSPITAL ENCOUNTER (OUTPATIENT)
Dept: RADIOLOGY | Age: 69
Discharge: HOME | End: 2019-10-04
Attending: INTERNAL MEDICINE
Payer: MEDICARE

## 2019-10-04 DIAGNOSIS — R68.89 OTHER GENERAL SYMPTOMS AND SIGNS: ICD-10-CM

## 2019-10-04 PROCEDURE — 25500000 HC DRUGS/INCIDENT RAD: Performed by: INTERNAL MEDICINE

## 2019-10-04 PROCEDURE — 63600105 HC IODINE BASED CONTRAST: Performed by: INTERNAL MEDICINE

## 2019-10-04 PROCEDURE — 71260 CT THORAX DX C+: CPT

## 2019-10-04 RX ADMIN — BARIUM SULFATE 900 ML: 21 SUSPENSION ORAL at 08:20

## 2019-10-04 RX ADMIN — IOHEXOL 100 ML: 350 INJECTION, SOLUTION INTRAVENOUS at 09:49

## 2019-10-14 ENCOUNTER — TRANSCRIBE ORDERS (OUTPATIENT)
Dept: REGISTRATION | Facility: HOSPITAL | Age: 69
End: 2019-10-14

## 2019-10-14 ENCOUNTER — APPOINTMENT (OUTPATIENT)
Dept: LAB | Facility: HOSPITAL | Age: 69
End: 2019-10-14
Attending: PHYSICIAN ASSISTANT
Payer: MEDICARE

## 2019-10-14 DIAGNOSIS — E87.1 HYPO-OSMOLALITY AND HYPONATREMIA: ICD-10-CM

## 2019-10-14 DIAGNOSIS — E83.42 HYPOMAGNESEMIA: Primary | ICD-10-CM

## 2019-10-14 DIAGNOSIS — E83.42 HYPOMAGNESEMIA: ICD-10-CM

## 2019-10-14 LAB
ALBUMIN SERPL-MCNC: 4 G/DL (ref 3.4–5)
ALP SERPL-CCNC: 52 IU/L (ref 35–126)
ALT SERPL-CCNC: 20 IU/L (ref 11–54)
ANION GAP SERPL CALC-SCNC: 8 MEQ/L (ref 3–15)
AST SERPL-CCNC: 23 IU/L (ref 15–41)
BILIRUB SERPL-MCNC: 0.6 MG/DL (ref 0.3–1.2)
BUN SERPL-MCNC: 13 MG/DL (ref 8–20)
CALCIUM SERPL-MCNC: 9.3 MG/DL (ref 8.9–10.3)
CHLORIDE SERPL-SCNC: 102 MEQ/L (ref 98–109)
CO2 SERPL-SCNC: 27 MEQ/L (ref 22–32)
CREAT SERPL-MCNC: 0.8 MG/DL
GFR SERPL CREATININE-BSD FRML MDRD: >60 ML/MIN/1.73M*2
GLUCOSE SERPL-MCNC: 99 MG/DL (ref 70–99)
MAGNESIUM SERPL-MCNC: 2.1 MG/DL (ref 1.8–2.5)
POTASSIUM SERPL-SCNC: 4 MEQ/L (ref 3.6–5.1)
PROT SERPL-MCNC: 6.1 G/DL (ref 6–8.2)
SODIUM SERPL-SCNC: 137 MEQ/L (ref 136–144)

## 2019-10-14 PROCEDURE — 80053 COMPREHEN METABOLIC PANEL: CPT

## 2019-10-14 PROCEDURE — 36415 COLL VENOUS BLD VENIPUNCTURE: CPT

## 2019-10-14 PROCEDURE — 83735 ASSAY OF MAGNESIUM: CPT

## 2019-10-30 ENCOUNTER — APPOINTMENT (OUTPATIENT)
Dept: LAB | Facility: HOSPITAL | Age: 69
End: 2019-10-30
Attending: INTERNAL MEDICINE
Payer: MEDICARE

## 2019-10-30 ENCOUNTER — TRANSCRIBE ORDERS (OUTPATIENT)
Dept: REGISTRATION | Facility: HOSPITAL | Age: 69
End: 2019-10-30

## 2019-10-30 DIAGNOSIS — T46.5X5S ADVERSE EFFECT OF OTHER ANTIHYPERTENSIVE DRUGS, SEQUELA: Primary | ICD-10-CM

## 2019-10-30 DIAGNOSIS — T46.5X5S ADVERSE EFFECT OF OTHER ANTIHYPERTENSIVE DRUGS, SEQUELA: ICD-10-CM

## 2019-10-30 LAB
ANION GAP SERPL CALC-SCNC: 11 MEQ/L (ref 3–15)
BUN SERPL-MCNC: 13 MG/DL (ref 8–20)
CALCIUM SERPL-MCNC: 9.5 MG/DL (ref 8.9–10.3)
CHLORIDE SERPL-SCNC: 106 MEQ/L (ref 98–109)
CO2 SERPL-SCNC: 26 MEQ/L (ref 22–32)
CREAT SERPL-MCNC: 0.9 MG/DL
GFR SERPL CREATININE-BSD FRML MDRD: >60 ML/MIN/1.73M*2
GLUCOSE SERPL-MCNC: 95 MG/DL (ref 70–99)
POTASSIUM SERPL-SCNC: 3.7 MEQ/L (ref 3.6–5.1)
SODIUM SERPL-SCNC: 143 MEQ/L (ref 136–144)

## 2019-10-30 PROCEDURE — 36415 COLL VENOUS BLD VENIPUNCTURE: CPT

## 2019-10-30 PROCEDURE — 80048 BASIC METABOLIC PNL TOTAL CA: CPT

## 2019-11-13 ENCOUNTER — ANESTHESIA (OUTPATIENT)
Dept: ENDOSCOPY | Facility: HOSPITAL | Age: 69
End: 2019-11-13
Payer: MEDICARE

## 2019-11-13 ENCOUNTER — ANESTHESIA EVENT (OUTPATIENT)
Dept: ENDOSCOPY | Facility: HOSPITAL | Age: 69
End: 2019-11-13
Payer: MEDICARE

## 2019-11-13 ENCOUNTER — HOSPITAL ENCOUNTER (OUTPATIENT)
Facility: HOSPITAL | Age: 69
Discharge: HOME | End: 2019-11-13
Attending: COLON & RECTAL SURGERY | Admitting: COLON & RECTAL SURGERY
Payer: MEDICARE

## 2019-11-13 VITALS
HEART RATE: 60 BPM | TEMPERATURE: 97.9 F | WEIGHT: 106 LBS | OXYGEN SATURATION: 100 % | SYSTOLIC BLOOD PRESSURE: 156 MMHG | RESPIRATION RATE: 16 BRPM | HEIGHT: 62 IN | DIASTOLIC BLOOD PRESSURE: 93 MMHG | BODY MASS INDEX: 19.51 KG/M2

## 2019-11-13 PROCEDURE — 25000000 HC PHARMACY GENERAL: Performed by: NURSE ANESTHETIST, CERTIFIED REGISTERED

## 2019-11-13 PROCEDURE — 25800000 HC PHARMACY IV SOLUTIONS: Performed by: NURSE ANESTHETIST, CERTIFIED REGISTERED

## 2019-11-13 PROCEDURE — 75000014 HC COLONSCOPY DIAGNOSTIC: Performed by: COLON & RECTAL SURGERY

## 2019-11-13 PROCEDURE — 63600000 HC DRUGS/DETAIL CODE: Performed by: NURSE ANESTHETIST, CERTIFIED REGISTERED

## 2019-11-13 PROCEDURE — 0DJD8ZZ INSPECTION OF LOWER INTESTINAL TRACT, VIA NATURAL OR ARTIFICIAL OPENING ENDOSCOPIC: ICD-10-PCS | Performed by: COLON & RECTAL SURGERY

## 2019-11-13 PROCEDURE — 37000002 HC ANESTHESIA MAC: Performed by: COLON & RECTAL SURGERY

## 2019-11-13 PROCEDURE — 45378 DIAGNOSTIC COLONOSCOPY: CPT | Performed by: COLON & RECTAL SURGERY

## 2019-11-13 RX ORDER — PROPOFOL 200MG/20ML
SYRINGE (ML) INTRAVENOUS AS NEEDED
Status: DISCONTINUED | OUTPATIENT
Start: 2019-11-13 | End: 2019-11-13 | Stop reason: SURG

## 2019-11-13 RX ORDER — OLANZAPINE 2.5 MG/1
TABLET ORAL
Refills: 1 | COMMUNITY
Start: 2019-10-18

## 2019-11-13 RX ORDER — LIDOCAINE HYDROCHLORIDE 10 MG/ML
INJECTION, SOLUTION EPIDURAL; INFILTRATION; INTRACAUDAL; PERINEURAL AS NEEDED
Status: DISCONTINUED | OUTPATIENT
Start: 2019-11-13 | End: 2019-11-13 | Stop reason: SURG

## 2019-11-13 RX ORDER — POTASSIUM CHLORIDE 1500 MG/1
20 TABLET, EXTENDED RELEASE ORAL
Refills: 1 | COMMUNITY
Start: 2019-09-23

## 2019-11-13 RX ORDER — MAGNESIUM OXIDE/MAG AA CHELATE 300 MG
CAPSULE ORAL
Refills: 0 | Status: ON HOLD | COMMUNITY
Start: 2019-08-07 | End: 2019-11-13

## 2019-11-13 RX ORDER — SODIUM CHLORIDE 9 MG/ML
INJECTION, SOLUTION INTRAVENOUS CONTINUOUS PRN
Status: DISCONTINUED | OUTPATIENT
Start: 2019-11-13 | End: 2019-11-13 | Stop reason: SURG

## 2019-11-13 RX ADMIN — LIDOCAINE HYDROCHLORIDE 5 ML: 10 INJECTION, SOLUTION EPIDURAL; INFILTRATION; INTRACAUDAL; PERINEURAL at 12:57

## 2019-11-13 RX ADMIN — PROPOFOL 60 MG: 10 INJECTION, EMULSION INTRAVENOUS at 12:57

## 2019-11-13 RX ADMIN — PROPOFOL 50 MG: 10 INJECTION, EMULSION INTRAVENOUS at 13:00

## 2019-11-13 RX ADMIN — SODIUM CHLORIDE: 9 INJECTION, SOLUTION INTRAVENOUS at 12:54

## 2019-11-13 RX ADMIN — PROPOFOL 50 MG: 10 INJECTION, EMULSION INTRAVENOUS at 13:07

## 2019-11-13 RX ADMIN — PROPOFOL 50 MG: 10 INJECTION, EMULSION INTRAVENOUS at 13:03

## 2019-11-13 NOTE — H&P
Randi Saul is here for interval follow-up.   Patient is status post Alejandro's resection in the context of perforated diverticulitis in 2013.  She subsequently presented to Dr. Bowen for Alejandro's reversal on 10/28/13.     She was last seen in the office approximately 2 years ago (her appointments had been postponed a couple of times)     The patient's last colonoscopy was in April 2016.     Patient denies any CP/SOB/N/V/F/C.  Patient denies any Melena or BPR.  No weight loss.  No additional constitutional symptoms. No obstructive symptoms     She has no acute complaints.  If she takes fiber daily and has 1 bowel movement a day.              Medications:   Current Outpatient Prescriptions:   •  levothyroxine (SYNTHROID) 100 mcg tablet, Take 100 mcg by mouth daily., Disp: , Rfl:   •  LORazepam (ATIVAN) 0.5 mg tablet, Take 0.5 mg by mouth 2 (two) times a day as needed for anxiety., Disp: , Rfl:   •  losartan-hydrochlorothiazide (HYZAAR) 50-12.5 mg per tablet, Take 1 tablet by mouth every morning., Disp: 30 tablet, Rfl: 0  •  meclizine (ANTIVERT) 25 mg tablet, Take 25 mg by mouth 2 (two) times a day as needed for dizziness., Disp: , Rfl:   •  metoprolol succinate XL (TOPROL-XL) 25 mg 24 hr tablet, Take 12.5 mg by mouth nightly.  , Disp: , Rfl:   •  multivit-minerals/folic acid (ADULT ONE DAILY GUMMIES ORAL), Take 1 Dose by mouth every morning., Disp: , Rfl:   •  pregabalin (LYRICA) 50 mg capsule, Take 50 mg by mouth 2 (two) times a day. In addition to 75 mg Lyrica each evening., Disp: , Rfl:   •  pregabalin (LYRICA) 75 mg capsule, Take 75 mg by mouth every evening. In addition to Lyrica 50 mg every morning and afternoon, Disp: , Rfl:      Past Medical History:  has a past medical history of Diverticulitis of colon; Fibromuscular dysplasia (CMS/HCC) (HCC); Hypertension; and Hypothyroidism.  Past Surgical History:  has a past surgical history that includes Colon surgery (10/28/2013); Colon surgery (05/21/2014); and  Colon surgery (04/27/2016).  Social History:  reports that she has never smoked. She has never used smokeless tobacco. She reports that she drinks about 4.2 oz of alcohol per week . She reports that she does not use drugs.  Family History:         Family History   Problem Relation Age of Onset   • Heart disease Mother     • Pulmonary fibrosis Father        Allergies: is allergic to azithromycin; levetiracetam; oxycodone; amoxicillin trihydrate; potassium clavulanate; and sulfa (sulfonamide antibiotics).      Review of Systems   Constitutional: Negative for activity change.   HENT: Negative for congestion.    Respiratory: Negative for apnea, chest tightness and wheezing.    Cardiovascular: Negative for chest pain, palpitations and leg swelling.   Gastrointestinal: Negative for nausea.   Endocrine: Negative for polyuria.   Skin: Negative for color change.   Allergic/Immunologic: Negative for immunocompromised state.   Neurological: Negative for dizziness.   Hematological: Negative for adenopathy.   The following have been reviewed and updated as appropriate in this visit:  Problems          Objective:  Vitals: /76   Temp 36.4 °C (97.6 °F)   Ht 1.524 m (5')   Wt 55.3 kg (122 lb)   BMI 23.83 kg/m²   Constitutional: He is oriented to person, place, and time. He appears well-developed and well-nourished.   HENT:   Head: Normocephalic and atraumatic.   Eyes: Conjunctivae are normal.   Neck: Normal range of motion, Supple,.   Cardiovascular: Normal rate.    Pulmonary/Chest: Effort normal.   Musculoskeletal: Normal range of motion. He exhibits no edema.   Neurological: He is alert and oriented to person, place, and time.   Skin: Skin is warm and dry.   Psychiatric: He has a normal mood and affect.   Nursing note and vitals reviewed.        Abdominal Exam:   Soft, Non-distended, Non-tender  Incision: C/D/I, No evidence of incisional hernia.   Wound is healing well.     Midline SILS incision no evidence of  hernia     Rectal Exam: Normal tone no regularity     ASSESSMENT/PLAN:  Patient is a 68-year-old female status post Alejandro's reversal in the context of perforated diverticulitis (2013).     She continues to do very well.  At this time we will schedule her for a colonoscopy within the next year.  We will see her at the time of her procedure.        Diverticulitis  We will proceed with surveillance colonoscopy.  Risk the procedure have been discussed, including bleeding and perforation.

## 2019-11-13 NOTE — ANESTHESIA PREPROCEDURE EVALUATION
Relevant Problems   CARDIOVASCULAR   (+) Essential hypertension      NEUROLOGY   (+) Anxiety   (+) Epilepsy (CMS/HCC)      Other   (+) Hypothyroid       Anesthesia ROS/MED HX      Neuro/Psych    seizures  Cardiovascular   hypertension  Endo/Other   Hypothyroidism  Body Habitus: Normal  ROS/MED HX Comments:    Neurology/Psychology: Took her anti-seizure medication at 12:30 PM. With sips. Here.       Past Surgical History:   Procedure Laterality Date   • COLON SURGERY  10/28/2013    Laparoscopic, (single port) resection of sigmoid colostomy, repair hernia, resection portion of sigmoid colon and rectum   • COLON SURGERY  05/21/2014    Colonoscopy to the transverse colon. Poor prep   • COLON SURGERY  04/27/2016    Colonoscopy to base of the cecum   • COLOSTOMY CLOSURE     • HERNIA REPAIR     • LAPAROTOMY SALPINGO OOPHORECTOMY Left    • SHOULDER SURGERY Right        Physical Exam    Airway   Mallampati: I   TM distance: >3 FB   Neck ROM: full        Anesthesia Plan    Plan: MAC    Technique: MAC     Lines and Monitors: PIV     Airway: natural airway / supplemental oxygen   ASA 2  Anesthetic plan and risks discussed with: patient  Induction:    intravenous   Postop Plan:   Pain Management: IV analgesics

## 2019-11-13 NOTE — ANESTHESIA POSTPROCEDURE EVALUATION
Patient: Randi Saul    Procedure Summary     Date:  11/13/19 Room / Location:  [facility] (D) / [facility]    Anesthesia Start:  1254 Anesthesia Stop:  1321    Procedure:  Colonoscopy (N/A ) Diagnosis:       Screening for colon cancer      (Screening)    Provider:  Jeff Bowen MD Responsible Provider:  Steven Carlson MD    Anesthesia Type:  MAC ASA Status:  2          Anesthesia Type: MAC  PACU Vitals  11/13/2019 1315 - 11/13/2019 1415      11/13/2019  1319 11/13/2019  1330          BP:  132/70  (!) 156/93      Temp:  36.6 °C (97.9 °F)  --      Pulse:  71  60      Resp:  16  16      SpO2:  100 %  100 %              Anesthesia Post Evaluation    Pain management: adequate  Patient location during evaluation: PACU  Patient participation: complete - patient participated  Level of consciousness: awake and alert  Cardiovascular status: acceptable  Airway Patency: adequate  Respiratory status: acceptable  Hydration status: acceptable  Anesthetic complications: no

## 2019-11-13 NOTE — POST-OP (NON-BILLABLE)
Preoperative diagnosis: Perforated diverticulitis with Alejandro's done elsewhere and laparoscopic reversal here in 2013    Postoperative diagnosis: Normal anastomosis minimal diverticular disease    Surgeon: Jeff Bowen MD    Procedure: Colonoscopy the base cecum    DD: 3.0    Findings: Minimal diverticula normal anastomosis    Followup: Repeat colonoscopy in 3 to 5 years

## 2019-11-13 NOTE — OP NOTE
_______________________________________________________________________________  Patient Name: Randi Saul           Procedure Date: 11/13/2019 12:47 PM  MRN: [id number]                     Account Number: [id number]  YOB: 1950             Age: 68  Gender: Female                        Note Status: Supervisor Override  Attending MD: HETAL MARROQUIN MD  _______________________________________________________________________________  Procedure:           Colonoscopy  Indications:         For therapy of adenomatous polyps in the colon,  Preoperative assessment  Providers:           HETAL MARROQUIN MD (Doctor)  Referring MD:        LAN MURRAY MD  Requesting Provider:  Medicines:           Monitored Anesthesia Care  Complications:       No immediate complications.  _______________________________________________________________________________  Procedure:           After I obtained informed consent, the scope was passed  under direct vision. Throughout the procedure, the  patient's blood pressure, pulse, and oxygen saturations  were monitored continuously. The was introduced through  the anus and advanced to the cecum, identified by  appendiceal orifice and ileocecal valve. The colonoscopy  was performed without difficulty. The patient tolerated  the procedure well. The quality of the bowel preparation  was good.  Findings:  Anastomosis Normal without evidence of recurrence  Scattered diverticula were found in the descending colon.  The exam was otherwise normal throughout the examined colon.  Impression:          - - Mild diverticulosis in the descending colon.  Recommendation:      - Put patient on a clear liquid diet starting today.  Procedure Code(s):   --- Professional ---  22973, Colonoscopy, flexible; diagnostic, including  collection of specimen(s) by brushing or washing, when  performed (separate procedure)  Diagnosis Code(s):   --- Professional ---  K57.30, Diverticulosis of large intestine  without  perforation or abscess without bleeding  CPT copyright 2018 [employer]. All rights reserved.  The codes documented in this report are preliminary and upon  review may  be revised to meet current compliance requirements.  Attending Participation:  I personally performed the entire procedure.  Hetal Marroquin MD  ________________  HETAL MARROQUIN MD  11/13/2019 2:13:07 PM  This report has been signed electronically.  Number of Addenda: 0  Note Initiated On: 11/13/2019 12:47 PM

## 2020-02-08 ENCOUNTER — APPOINTMENT (EMERGENCY)
Dept: RADIOLOGY | Facility: HOSPITAL | Age: 70
End: 2020-02-08
Attending: EMERGENCY MEDICINE
Payer: MEDICARE

## 2020-02-08 ENCOUNTER — HOSPITAL ENCOUNTER (EMERGENCY)
Facility: HOSPITAL | Age: 70
Discharge: HOME | End: 2020-02-08
Attending: EMERGENCY MEDICINE
Payer: MEDICARE

## 2020-02-08 VITALS
BODY MASS INDEX: 21.4 KG/M2 | HEIGHT: 60 IN | TEMPERATURE: 98.9 F | HEART RATE: 62 BPM | SYSTOLIC BLOOD PRESSURE: 151 MMHG | OXYGEN SATURATION: 99 % | WEIGHT: 109 LBS | DIASTOLIC BLOOD PRESSURE: 77 MMHG | RESPIRATION RATE: 16 BRPM

## 2020-02-08 DIAGNOSIS — M25.512 ACUTE PAIN OF LEFT SHOULDER: ICD-10-CM
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
## 2020-02-08 DIAGNOSIS — S80.02XA CONTUSION OF LEFT KNEE, INITIAL ENCOUNTER: ICD-10-CM

## 2020-02-08 PROCEDURE — 99283 EMERGENCY DEPT VISIT LOW MDM: CPT | Mod: 25

## 2020-02-08 PROCEDURE — 73564 X-RAY EXAM KNEE 4 OR MORE: CPT | Mod: LT

## 2020-02-08 PROCEDURE — 73030 X-RAY EXAM OF SHOULDER: CPT | Mod: LT

## 2020-02-08 NOTE — ED PROVIDER NOTES
HPI     Chief Complaint   Patient presents with   • Fall   • Knee Injury   • Arm Injury       HPI  Patient is a 69-year-old female with significant past medical history for anxiety, diverticulitis who presents the emergency department for evaluation of knee and shoulder pain after fall.78   Patient History     Past Medical History:   Diagnosis Date   • Anxiety    • Cataract    • Depression    • Diverticulitis of colon    • Fibromuscular dysplasia (CMS/HCC)    • Glaucoma suspect of both eyes    • History of colostomy reversal    • Hypertension    • Hypothyroidism    • Numbness and tingling of right hand    • Perforated bowel (CMS/HCC)    • Seasonal allergies    • Seizures (CMS/HCC) 04/07/2019   • Umbilical hernia        Past Surgical History:   Procedure Laterality Date   • COLON SURGERY  10/28/2013    Laparoscopic, (single port) resection of sigmoid colostomy, repair hernia, resection portion of sigmoid colon and rectum   • COLON SURGERY  05/21/2014    Colonoscopy to the transverse colon. Poor prep   • COLON SURGERY  04/27/2016    Colonoscopy to base of the cecum   • COLOSTOMY CLOSURE     • HERNIA REPAIR     • LAPAROTOMY SALPINGO OOPHORECTOMY Left    • SHOULDER SURGERY Right        Family History   Problem Relation Age of Onset   • Heart disease Biological Mother    • Pulmonary fibrosis Biological Father        Social History     Tobacco Use   • Smoking status: Former Smoker     Packs/day: 1.00     Years: 15.00     Pack years: 15.00     Types: Cigarettes   • Smokeless tobacco: Never Used   • Tobacco comment: quit 35 years ago   Substance Use Topics   • Alcohol use: Yes     Alcohol/week: 7.0 standard drinks     Types: 7 Glasses of wine per week     Comment: occasionally    • Drug use: No       Systems Reviewed from Nursing Triage:          Review of Systems     Review of Systems     Physical Exam     ED Triage Vitals [02/08/20 0950]   Temp Heart Rate Resp BP SpO2   37.2 °C (98.9 °F) 79 18 (!) 135/7 98 %      Temp  Source Heart Rate Source Patient Position BP Location FiO2 (%) (Set)   Temporal -- Sitting Left upper arm --                     Patient Vitals for the past 24 hrs:   BP Temp Temp src Pulse Resp SpO2 Height Weight   02/08/20 0950 (!) 135/7 37.2 °C (98.9 °F) Temporal 79 18 98 % 1.524 m (5') 49.4 kg (109 lb)                                       Physical Exam         Procedures    ED Course & MDM     Labs Reviewed - No data to display    X-RAY KNEE LEFT 4+ VIEWS    (Results Pending)   X-RAY SHOULDER LEFT 2+ VIEWS    (Results Pending)               MDM         Clinical Impressions as of Feb 08 1250   Fall, initial encounter   Contusion of left knee, initial encounter   Acute pain of left shoulder        Rehana Lane PA C  02/08/20 1250

## 2020-02-08 NOTE — DISCHARGE INSTRUCTIONS
No evidence of any fracture or dislocation on your x-rays.  Close follow-up with orthopedics as needed.  Apply ice to affected areas.

## 2020-02-08 NOTE — ED ATTESTATION NOTE
The patient was evaluated and managed by the PA/NP.  I have discussed the case with the PA/NP and I have reviewed the imaging results.  I am in agreement with the ED workup and treatment plan.  I will continue to be available for consultation as needed.     Godshall, Duane K, MD  02/08/20 1947

## 2020-02-14 ENCOUNTER — HOSPITAL ENCOUNTER (OUTPATIENT)
Dept: RADIOLOGY | Facility: HOSPITAL | Age: 70
Discharge: HOME | End: 2020-02-14
Attending: FAMILY MEDICINE
Payer: MEDICARE

## 2020-02-14 ENCOUNTER — TRANSCRIBE ORDERS (OUTPATIENT)
Dept: REGISTRATION | Facility: HOSPITAL | Age: 70
End: 2020-02-14

## 2020-02-14 DIAGNOSIS — M25.562 PAIN IN LEFT KNEE: ICD-10-CM

## 2020-02-14 DIAGNOSIS — M25.562 PAIN IN LEFT KNEE: Primary | ICD-10-CM

## 2020-02-14 PROCEDURE — 73564 X-RAY EXAM KNEE 4 OR MORE: CPT | Mod: LT

## 2020-02-24 ENCOUNTER — TRANSCRIBE ORDERS (OUTPATIENT)
Dept: REGISTRATION | Facility: HOSPITAL | Age: 70
End: 2020-02-24

## 2020-02-24 ENCOUNTER — APPOINTMENT (OUTPATIENT)
Dept: LAB | Facility: HOSPITAL | Age: 70
End: 2020-02-24
Attending: INTERNAL MEDICINE
Payer: MEDICARE

## 2020-02-24 DIAGNOSIS — E06.3 AUTOIMMUNE THYROIDITIS: ICD-10-CM

## 2020-02-24 DIAGNOSIS — R63.4 ABNORMAL WEIGHT LOSS: ICD-10-CM

## 2020-02-24 DIAGNOSIS — E06.3 AUTOIMMUNE THYROIDITIS: Primary | ICD-10-CM

## 2020-02-24 LAB
ALBUMIN SERPL-MCNC: 4.2 G/DL (ref 3.4–5)
ALP SERPL-CCNC: 51 IU/L (ref 35–126)
ALT SERPL-CCNC: 19 IU/L (ref 11–54)
ANION GAP SERPL CALC-SCNC: 10 MEQ/L (ref 3–15)
AST SERPL-CCNC: 20 IU/L (ref 15–41)
BILIRUB SERPL-MCNC: 0.5 MG/DL (ref 0.3–1.2)
BUN SERPL-MCNC: 16 MG/DL (ref 8–20)
CALCIUM SERPL-MCNC: 9.5 MG/DL (ref 8.9–10.3)
CHLORIDE SERPL-SCNC: 108 MEQ/L (ref 98–109)
CO2 SERPL-SCNC: 23 MEQ/L (ref 22–32)
CREAT SERPL-MCNC: 0.8 MG/DL (ref 0.6–1.1)
GFR SERPL CREATININE-BSD FRML MDRD: >60 ML/MIN/1.73M*2
GLUCOSE SERPL-MCNC: 91 MG/DL (ref 70–99)
POTASSIUM SERPL-SCNC: 4.4 MEQ/L (ref 3.6–5.1)
PROT SERPL-MCNC: 6.4 G/DL (ref 6–8.2)
SODIUM SERPL-SCNC: 141 MEQ/L (ref 136–144)
T4 FREE SERPL-MCNC: 0.94 NG/DL (ref 0.58–1.64)
TSH SERPL DL<=0.05 MIU/L-ACNC: 0.34 MIU/L (ref 0.34–5.6)

## 2020-02-24 PROCEDURE — 84439 ASSAY OF FREE THYROXINE: CPT

## 2020-02-24 PROCEDURE — 80053 COMPREHEN METABOLIC PANEL: CPT

## 2020-02-24 PROCEDURE — 84443 ASSAY THYROID STIM HORMONE: CPT

## 2020-02-24 PROCEDURE — 36415 COLL VENOUS BLD VENIPUNCTURE: CPT

## 2021-04-13 DIAGNOSIS — Z23 ENCOUNTER FOR IMMUNIZATION: ICD-10-CM

## 2022-05-11 ENCOUNTER — APPOINTMENT (OUTPATIENT)
Dept: LAB | Facility: HOSPITAL | Age: 72
End: 2022-05-11
Attending: FAMILY MEDICINE
Payer: MEDICARE

## 2022-05-11 ENCOUNTER — TRANSCRIBE ORDERS (OUTPATIENT)
Dept: LAB | Facility: HOSPITAL | Age: 72
End: 2022-05-11

## 2022-05-11 DIAGNOSIS — E87.1 HYPO-OSMOLALITY AND HYPONATREMIA: Primary | ICD-10-CM

## 2022-05-11 DIAGNOSIS — E06.3 AUTOIMMUNE THYROIDITIS: ICD-10-CM

## 2022-05-11 DIAGNOSIS — E06.3 AUTOIMMUNE THYROIDITIS: Primary | ICD-10-CM

## 2022-05-11 DIAGNOSIS — E87.1 HYPO-OSMOLALITY AND HYPONATREMIA: ICD-10-CM

## 2022-05-11 LAB
ALBUMIN SERPL-MCNC: 4.4 G/DL (ref 3.4–5)
ALP SERPL-CCNC: 59 IU/L (ref 35–126)
ALT SERPL-CCNC: 23 IU/L (ref 11–54)
ANION GAP SERPL CALC-SCNC: 14 MEQ/L (ref 3–15)
AST SERPL-CCNC: 23 IU/L (ref 15–41)
BILIRUB SERPL-MCNC: 1 MG/DL (ref 0.3–1.2)
BUN SERPL-MCNC: 13 MG/DL (ref 8–20)
CALCIUM SERPL-MCNC: 10.2 MG/DL (ref 8.9–10.3)
CHLORIDE SERPL-SCNC: 101 MEQ/L (ref 98–109)
CO2 SERPL-SCNC: 25 MEQ/L (ref 22–32)
CREAT SERPL-MCNC: 0.8 MG/DL (ref 0.6–1.1)
GFR SERPL CREATININE-BSD FRML MDRD: >60 ML/MIN/1.73M*2
GLUCOSE SERPL-MCNC: 106 MG/DL (ref 70–99)
POTASSIUM SERPL-SCNC: 3.5 MEQ/L (ref 3.6–5.1)
PROT SERPL-MCNC: 6.1 G/DL (ref 6–8.2)
SODIUM SERPL-SCNC: 140 MEQ/L (ref 136–144)
T4 FREE SERPL-MCNC: 0.94 NG/DL (ref 0.58–1.64)
TSH SERPL DL<=0.05 MIU/L-ACNC: 0.94 MIU/L (ref 0.34–5.6)

## 2022-05-11 PROCEDURE — 36415 COLL VENOUS BLD VENIPUNCTURE: CPT

## 2022-05-11 PROCEDURE — 84443 ASSAY THYROID STIM HORMONE: CPT

## 2022-05-11 PROCEDURE — 84439 ASSAY OF FREE THYROXINE: CPT

## 2022-05-11 PROCEDURE — 80053 COMPREHEN METABOLIC PANEL: CPT

## 2022-07-24 ENCOUNTER — HOSPITAL ENCOUNTER (OUTPATIENT)
Facility: CLINIC | Age: 72
Discharge: HOME | End: 2022-07-24
Attending: FAMILY MEDICINE
Payer: MEDICARE

## 2022-07-24 VITALS
TEMPERATURE: 97 F | BODY MASS INDEX: 22.73 KG/M2 | HEART RATE: 81 BPM | SYSTOLIC BLOOD PRESSURE: 128 MMHG | DIASTOLIC BLOOD PRESSURE: 78 MMHG | OXYGEN SATURATION: 99 % | WEIGHT: 116.4 LBS

## 2022-07-24 DIAGNOSIS — R53.83 FATIGUE, UNSPECIFIED TYPE: Primary | ICD-10-CM

## 2022-07-24 PROCEDURE — 99213 OFFICE O/P EST LOW 20 MIN: CPT | Performed by: FAMILY MEDICINE

## 2022-07-24 ASSESSMENT — ENCOUNTER SYMPTOMS
NEUROLOGICAL NEGATIVE: 1
FATIGUE: 1
EYES NEGATIVE: 1
BACK PAIN: 0
CHILLS: 0
CARDIOVASCULAR NEGATIVE: 1
FEVER: 0
PSYCHIATRIC NEGATIVE: 1
GASTROINTESTINAL NEGATIVE: 1
RESPIRATORY NEGATIVE: 1

## 2022-07-24 NOTE — DISCHARGE INSTRUCTIONS
Follow up with your primary care doctor for labs   Go to ER at [facility] if symptoms gets worse

## 2022-07-24 NOTE — ED PROVIDER NOTES
History  Chief Complaint   Patient presents with   • Fatigue     She is here with the c/o feeling fatigue and feels lethargic for 4 days   She had low NA AND K in the past   Also has h/o hypothyroidism and take med for that  No h/o anemia   No recent tick bite   Called pcp office and has an appt for tuesday which is in 2 days for telemedicine  Got covid test 2 days ago and negative  No swelling of joints ,no joint pains,no headache ,no rash            Past Medical History:   Diagnosis Date   • Anxiety    • Cataract    • Depression    • Diverticulitis of colon    • Fibromuscular dysplasia (CMS/HCC)    • Glaucoma suspect of both eyes    • History of colostomy reversal    • Hypertension    • Hypothyroidism    • Numbness and tingling of right hand    • Perforated bowel (CMS/HCC)    • Seasonal allergies    • Seizures (CMS/HCC) 04/07/2019   • Umbilical hernia        Past Surgical History:   Procedure Laterality Date   • COLON SURGERY  10/28/2013    Laparoscopic, (single port) resection of sigmoid colostomy, repair hernia, resection portion of sigmoid colon and rectum   • COLON SURGERY  05/21/2014    Colonoscopy to the transverse colon. Poor prep   • COLON SURGERY  04/27/2016    Colonoscopy to base of the cecum   • COLOSTOMY CLOSURE     • HERNIA REPAIR     • LAPAROTOMY SALPINGO OOPHORECTOMY Left    • SHOULDER SURGERY Right        Family History   Problem Relation Age of Onset   • Heart disease Biological Mother    • Pulmonary fibrosis Biological Father        Social History     Tobacco Use   • Smoking status: Former Smoker     Packs/day: 1.00     Years: 15.00     Pack years: 15.00     Types: Cigarettes   • Smokeless tobacco: Never Used   • Tobacco comment: quit 35 years ago   Substance Use Topics   • Alcohol use: Yes     Alcohol/week: 7.0 standard drinks     Types: 7 Glasses of wine per week     Comment: occasionally    • Drug use: No       Review of Systems   Constitutional: Positive for fatigue. Negative for chills and  fever.   HENT: Negative.    Eyes: Negative.    Respiratory: Negative.    Cardiovascular: Negative.    Gastrointestinal: Negative.    Genitourinary: Negative.    Musculoskeletal: Negative for back pain.   Skin: Negative for rash.   Neurological: Negative.    Psychiatric/Behavioral: Negative.        Physical Exam  ED Triage Vitals [07/24/22 1321]   Temp Heart Rate Resp BP SpO2   36.1 °C (97 °F) 81 -- 128/78 99 %      Temp src Heart Rate Source Patient Position BP Location FiO2 (%) (Set)   -- -- -- -- --       Physical Exam  Constitutional:       General: She is not in acute distress.  HENT:      Right Ear: Tympanic membrane, ear canal and external ear normal.      Left Ear: Tympanic membrane, ear canal and external ear normal.      Nose: Nose normal.      Mouth/Throat:      Mouth: Mucous membranes are moist.      Pharynx: No oropharyngeal exudate or posterior oropharyngeal erythema.   Eyes:      General:         Right eye: No discharge.         Left eye: No discharge.      Extraocular Movements: Extraocular movements intact.      Conjunctiva/sclera: Conjunctivae normal.      Pupils: Pupils are equal, round, and reactive to light.   Cardiovascular:      Rate and Rhythm: Normal rate and regular rhythm.      Pulses: Normal pulses.      Heart sounds: Normal heart sounds.   Pulmonary:      Effort: Pulmonary effort is normal.      Breath sounds: Normal breath sounds. No wheezing, rhonchi or rales.   Musculoskeletal:      Cervical back: Normal range of motion and neck supple.   Lymphadenopathy:      Cervical: No cervical adenopathy.   Skin:     Findings: No rash.   Neurological:      General: No focal deficit present.      Mental Status: She is alert and oriented to person, place, and time. Mental status is at baseline.      Cranial Nerves: No cranial nerve deficit.      Sensory: No sensory deficit.      Motor: No weakness.      Coordination: Coordination normal.      Gait: Gait normal.      Deep Tendon Reflexes: Reflexes  normal.   Psychiatric:         Mood and Affect: Mood normal.           Procedures  Procedures    UC Course  Clinical Impressions as of 07/24/22 1934   Fatigue, unspecified type       MDM  Number of Diagnoses or Management Options  Diagnosis management comments: Fatigue : being seen in urgent care I advised to follow up with pcp as she needs labs   For further evaluation ,and advised to go to ER   If symptoms gets worse before the PCP visit  She would like to hold off going to ER today and she will call her pcp to be seen tomorrow  Benign physical exam now                  Benita Robison MD  07/24/22 1933       Benita Robison MD  07/24/22 1935

## 2022-08-27 ENCOUNTER — TRANSCRIBE ORDERS (OUTPATIENT)
Dept: REGISTRATION | Facility: HOSPITAL | Age: 72
End: 2022-08-27

## 2022-08-27 ENCOUNTER — APPOINTMENT (OUTPATIENT)
Dept: LAB | Facility: HOSPITAL | Age: 72
End: 2022-08-27
Attending: FAMILY MEDICINE
Payer: MEDICARE

## 2022-08-27 DIAGNOSIS — Q78.2 OSTEOPETROSIS: ICD-10-CM

## 2022-08-27 DIAGNOSIS — R73.09 OTHER ABNORMAL GLUCOSE: Primary | ICD-10-CM

## 2022-08-27 DIAGNOSIS — D52.0 DIETARY FOLATE DEFICIENCY ANEMIA: ICD-10-CM

## 2022-08-27 DIAGNOSIS — E87.8 OTHER DISORDERS OF ELECTROLYTE AND FLUID BALANCE, NOT ELSEWHERE CLASSIFIED: Primary | ICD-10-CM

## 2022-08-27 DIAGNOSIS — R61 GENERALIZED HYPERHIDROSIS: ICD-10-CM

## 2022-08-27 DIAGNOSIS — R53.83 OTHER FATIGUE: ICD-10-CM

## 2022-08-27 DIAGNOSIS — E87.8 OTHER DISORDERS OF ELECTROLYTE AND FLUID BALANCE, NOT ELSEWHERE CLASSIFIED: ICD-10-CM

## 2022-08-27 DIAGNOSIS — R73.09 OTHER ABNORMAL GLUCOSE: ICD-10-CM

## 2022-08-27 LAB
25(OH)D3 SERPL-MCNC: 45 NG/ML (ref 30–100)
ALBUMIN SERPL-MCNC: 4.1 G/DL (ref 3.4–5)
ALP SERPL-CCNC: 59 IU/L (ref 35–126)
ALT SERPL-CCNC: 20 IU/L (ref 11–54)
ANION GAP SERPL CALC-SCNC: 8 MEQ/L (ref 3–15)
AST SERPL-CCNC: 22 IU/L (ref 15–41)
BASOPHILS # BLD: 0.07 K/UL (ref 0.01–0.1)
BASOPHILS NFR BLD: 1.8 %
BILIRUB SERPL-MCNC: 0.8 MG/DL (ref 0.3–1.2)
BUN SERPL-MCNC: 14 MG/DL (ref 8–20)
CALCIUM SERPL-MCNC: 9.8 MG/DL (ref 8.9–10.3)
CHLORIDE SERPL-SCNC: 97 MEQ/L (ref 98–109)
CO2 SERPL-SCNC: 30 MEQ/L (ref 22–32)
CORTIS SERPL-MCNC: 18 UG/DL
CREAT SERPL-MCNC: 0.9 MG/DL (ref 0.6–1.1)
DIFFERENTIAL METHOD BLD: ABNORMAL
EOSINOPHIL # BLD: 0.13 K/UL (ref 0.04–0.36)
EOSINOPHIL NFR BLD: 3.4 %
ERYTHROCYTE [DISTWIDTH] IN BLOOD BY AUTOMATED COUNT: 12 % (ref 11.7–14.4)
ERYTHROCYTE [SEDIMENTATION RATE] IN BLOOD BY WESTERGREN METHOD: 3 MM/HR
EST. AVERAGE GLUCOSE BLD GHB EST-MCNC: 97 MG/DL
FERRITIN SERPL-MCNC: 121 NG/ML (ref 11–250)
FOLATE SERPL-MCNC: >20 NG/ML
GFR SERPL CREATININE-BSD FRML MDRD: >60 ML/MIN/1.73M*2
GLUCOSE SERPL-MCNC: 87 MG/DL (ref 70–99)
HBA1C MFR BLD HPLC: 5 %
HCT VFR BLDCO AUTO: 35.7 % (ref 35–45)
HGB BLD-MCNC: 12.3 G/DL (ref 11.8–15.7)
IMM GRANULOCYTES # BLD AUTO: 0.04 K/UL (ref 0–0.08)
IMM GRANULOCYTES NFR BLD AUTO: 1 %
IRON SATN MFR SERPL: 20 % (ref 15–45)
IRON SERPL-MCNC: 64 UG/DL (ref 35–150)
LYMPHOCYTES # BLD: 0.78 K/UL (ref 1.2–3.5)
LYMPHOCYTES NFR BLD: 20.2 %
MCH RBC QN AUTO: 32.2 PG (ref 28–33.2)
MCHC RBC AUTO-ENTMCNC: 34.5 G/DL (ref 32.2–35.5)
MCV RBC AUTO: 93.5 FL (ref 83–98)
MONOCYTES # BLD: 0.19 K/UL (ref 0.28–0.8)
MONOCYTES NFR BLD: 4.9 %
NEUTROPHILS # BLD: 2.66 K/UL (ref 1.7–7)
NEUTS SEG NFR BLD: 68.7 %
NRBC BLD-RTO: 0 %
PDW BLD AUTO: 9.4 FL (ref 9.4–12.3)
PLAT MORPH BLD: NORMAL
PLATELET # BLD AUTO: 226 K/UL (ref 150–369)
PLATELET # BLD EST: ABNORMAL 10*3/UL
POTASSIUM SERPL-SCNC: 3.1 MEQ/L (ref 3.6–5.1)
PROT SERPL-MCNC: 6.2 G/DL (ref 6–8.2)
RBC # BLD AUTO: 3.82 M/UL (ref 3.93–5.22)
RBC MORPH BLD: NORMAL
SODIUM SERPL-SCNC: 135 MEQ/L (ref 136–144)
T4 FREE SERPL-MCNC: 0.88 NG/DL (ref 0.58–1.64)
TIBC SERPL-MCNC: 328 UG/DL (ref 270–460)
TSH SERPL DL<=0.05 MIU/L-ACNC: 2.61 MIU/L (ref 0.34–5.6)
UIBC SERPL-MCNC: 264 UG/DL (ref 180–360)
VIT B12 SERPL-MCNC: 1053 PG/ML (ref 180–914)
WBC # BLD AUTO: 3.87 K/UL (ref 3.8–10.5)

## 2022-08-27 PROCEDURE — 85025 COMPLETE CBC W/AUTO DIFF WBC: CPT

## 2022-08-27 PROCEDURE — 84439 ASSAY OF FREE THYROXINE: CPT

## 2022-08-27 PROCEDURE — 82746 ASSAY OF FOLIC ACID SERUM: CPT

## 2022-08-27 PROCEDURE — 83835 ASSAY OF METANEPHRINES: CPT

## 2022-08-27 PROCEDURE — 82306 VITAMIN D 25 HYDROXY: CPT

## 2022-08-27 PROCEDURE — 84443 ASSAY THYROID STIM HORMONE: CPT

## 2022-08-27 PROCEDURE — 83550 IRON BINDING TEST: CPT

## 2022-08-27 PROCEDURE — 36415 COLL VENOUS BLD VENIPUNCTURE: CPT

## 2022-08-27 PROCEDURE — 80053 COMPREHEN METABOLIC PANEL: CPT

## 2022-08-27 PROCEDURE — 86038 ANTINUCLEAR ANTIBODIES: CPT

## 2022-08-27 PROCEDURE — 84305 ASSAY OF SOMATOMEDIN: CPT

## 2022-08-27 PROCEDURE — 82607 VITAMIN B-12: CPT

## 2022-08-27 PROCEDURE — 82728 ASSAY OF FERRITIN: CPT

## 2022-08-27 PROCEDURE — 85652 RBC SED RATE AUTOMATED: CPT

## 2022-08-27 PROCEDURE — 86341 ISLET CELL ANTIBODY: CPT

## 2022-08-27 PROCEDURE — 86431 RHEUMATOID FACTOR QUANT: CPT

## 2022-08-27 PROCEDURE — 82024 ASSAY OF ACTH: CPT

## 2022-08-27 PROCEDURE — 82533 TOTAL CORTISOL: CPT

## 2022-08-27 PROCEDURE — 83036 HEMOGLOBIN GLYCOSYLATED A1C: CPT

## 2022-08-28 LAB — RHEUMATOID FACT SERPL-ACNC: <=7 IU/ML

## 2022-08-30 LAB — ANA SER QL HEP2 SUBST: NEGATIVE

## 2022-08-31 LAB
IGF-I SERPL-MCNC: 144 NG/ML (ref 34–245)
IGF-I Z-SCORE SERPL: 0.6 SD

## 2022-09-01 LAB — ACTH PLAS-MCNC: 19 PG/ML (ref 6–50)

## 2022-09-02 LAB
METANEPH FREE SERPL-MCNC: 34 PG/ML
METANEPHS SERPL-MCNC: 119 PG/ML
NORMETANEPH FREE SERPL-MCNC: 85 PG/ML

## 2022-09-28 ENCOUNTER — TRANSCRIBE ORDERS (OUTPATIENT)
Dept: SCHEDULING | Age: 72
End: 2022-09-28

## 2022-09-28 DIAGNOSIS — R41.3 OTHER AMNESIA: Primary | ICD-10-CM

## 2022-10-03 ENCOUNTER — HOSPITAL ENCOUNTER (OUTPATIENT)
Dept: NEUROLOGY | Facility: HOSPITAL | Age: 72
Discharge: HOME | End: 2022-10-03
Attending: PSYCHIATRY & NEUROLOGY
Payer: MEDICARE

## 2022-10-03 ENCOUNTER — HOSPITAL ENCOUNTER (OUTPATIENT)
Dept: RADIOLOGY | Facility: HOSPITAL | Age: 72
Discharge: HOME | End: 2022-10-03
Attending: PSYCHIATRY & NEUROLOGY
Payer: MEDICARE

## 2022-10-03 DIAGNOSIS — R41.3 OTHER AMNESIA: ICD-10-CM

## 2022-10-03 PROCEDURE — 95816 EEG AWAKE AND DROWSY: CPT

## 2022-10-03 PROCEDURE — 70450 CT HEAD/BRAIN W/O DYE: CPT

## 2023-10-11 ENCOUNTER — TELEPHONE (OUTPATIENT)
Dept: COLORECTAL SURGERY | Facility: CLINIC | Age: 73
End: 2023-10-11
Payer: MEDICARE

## 2024-03-24 ENCOUNTER — TELEPHONE (OUTPATIENT)
Dept: COLORECTAL SURGERY | Facility: CLINIC | Age: 74
End: 2024-03-24
Payer: MEDICARE

## 2024-03-24 NOTE — TELEPHONE ENCOUNTER
Called patient - lvm     CHANGE IN TIME    PROCEDURE WITH DR. MARROQUIN:  COLONOSCOPY ON 04/03/24 @ 12:00 PM     TIME OF ARRIVAL: (11:00 AM)      DIRECTIONS TO COLONOSCOPY:  [place] [facility]/PARKING LOT A        REFERRAL: NOT REQUIRED     AUTHORIZATION:  NOT REQUIRED     PREP ATTACHED: PLEASE SEE ATTACHMENT FOR INSTRUCTIONS

## 2024-04-24 ENCOUNTER — APPOINTMENT (OUTPATIENT)
Dept: LAB | Facility: HOSPITAL | Age: 74
End: 2024-04-24
Attending: FAMILY MEDICINE
Payer: MEDICARE

## 2024-04-24 ENCOUNTER — TRANSCRIBE ORDERS (OUTPATIENT)
Dept: REGISTRATION | Facility: HOSPITAL | Age: 74
End: 2024-04-24

## 2024-04-24 DIAGNOSIS — R53.83 OTHER FATIGUE: ICD-10-CM

## 2024-04-24 DIAGNOSIS — E03.9 HYPOTHYROIDISM, UNSPECIFIED: Primary | ICD-10-CM

## 2024-04-24 DIAGNOSIS — E87.1 HYPO-OSMOLALITY AND HYPONATREMIA: ICD-10-CM

## 2024-04-24 DIAGNOSIS — E03.9 HYPOTHYROIDISM, UNSPECIFIED: ICD-10-CM

## 2024-04-24 LAB
ALBUMIN SERPL-MCNC: 4.9 G/DL (ref 3.5–5.7)
ALP SERPL-CCNC: 62 IU/L (ref 34–125)
ALT SERPL-CCNC: 17 IU/L (ref 7–52)
ANION GAP SERPL CALC-SCNC: 10 MEQ/L (ref 3–15)
AST SERPL-CCNC: 23 IU/L (ref 13–39)
BASOPHILS # BLD: 0.06 K/UL (ref 0.01–0.1)
BASOPHILS NFR BLD: 1.1 %
BILIRUB SERPL-MCNC: 0.5 MG/DL (ref 0.3–1.2)
BUN SERPL-MCNC: 18 MG/DL (ref 7–25)
CALCIUM SERPL-MCNC: 9.8 MG/DL (ref 8.6–10.3)
CHLORIDE SERPL-SCNC: 98 MEQ/L (ref 98–107)
CO2 SERPL-SCNC: 26 MEQ/L (ref 21–31)
CREAT SERPL-MCNC: 0.8 MG/DL (ref 0.6–1.2)
DIFFERENTIAL METHOD BLD: ABNORMAL
EGFRCR SERPLBLD CKD-EPI 2021: >60 ML/MIN/1.73M*2
EOSINOPHIL # BLD: 0.03 K/UL (ref 0.04–0.36)
EOSINOPHIL NFR BLD: 0.5 %
ERYTHROCYTE [DISTWIDTH] IN BLOOD BY AUTOMATED COUNT: 12.1 % (ref 11.7–14.4)
GLUCOSE SERPL-MCNC: 94 MG/DL (ref 70–99)
HCT VFR BLD AUTO: 38.1 % (ref 35–45)
HGB BLD-MCNC: 13.1 G/DL (ref 11.8–15.7)
IMM GRANULOCYTES # BLD AUTO: 0.02 K/UL (ref 0–0.08)
IMM GRANULOCYTES NFR BLD AUTO: 0.4 %
LYMPHOCYTES # BLD: 0.83 K/UL (ref 1.2–3.5)
LYMPHOCYTES NFR BLD: 14.5 %
MCH RBC QN AUTO: 32.8 PG (ref 28–33.2)
MCHC RBC AUTO-ENTMCNC: 34.4 G/DL (ref 32.2–35.5)
MCV RBC AUTO: 95.5 FL (ref 83–98)
MONOCYTES # BLD: 0.3 K/UL (ref 0.28–0.8)
MONOCYTES NFR BLD: 5.3 %
NEUTROPHILS # BLD: 4.47 K/UL (ref 1.7–7)
NEUTS SEG NFR BLD: 78.2 %
NRBC BLD-RTO: 0 %
PDW BLD AUTO: 10.2 FL (ref 9.4–12.3)
PLATELET # BLD AUTO: 265 K/UL (ref 150–369)
POTASSIUM SERPL-SCNC: 3.9 MEQ/L (ref 3.5–5.1)
PROT SERPL-MCNC: 7.1 G/DL (ref 6–8.2)
RBC # BLD AUTO: 3.99 M/UL (ref 3.93–5.22)
SODIUM SERPL-SCNC: 134 MEQ/L (ref 136–145)
TSH SERPL DL<=0.05 MIU/L-ACNC: 0.88 MIU/L (ref 0.34–5.6)
WBC # BLD AUTO: 5.71 K/UL (ref 3.8–10.5)

## 2024-04-24 PROCEDURE — 36415 COLL VENOUS BLD VENIPUNCTURE: CPT

## 2024-04-24 PROCEDURE — 80053 COMPREHEN METABOLIC PANEL: CPT

## 2024-04-24 PROCEDURE — 85025 COMPLETE CBC W/AUTO DIFF WBC: CPT

## 2024-04-24 PROCEDURE — 82040 ASSAY OF SERUM ALBUMIN: CPT

## 2024-04-24 PROCEDURE — 84443 ASSAY THYROID STIM HORMONE: CPT

## 2024-05-20 ENCOUNTER — TRANSCRIBE ORDERS (OUTPATIENT)
Dept: LAB | Age: 74
End: 2024-05-20

## 2024-05-20 ENCOUNTER — APPOINTMENT (OUTPATIENT)
Dept: LAB | Age: 74
End: 2024-05-20
Attending: FAMILY MEDICINE
Payer: MEDICARE

## 2024-05-20 DIAGNOSIS — E87.1 HYPO-OSMOLALITY AND HYPONATREMIA: Primary | ICD-10-CM

## 2024-05-20 DIAGNOSIS — E87.1 HYPO-OSMOLALITY AND HYPONATREMIA: ICD-10-CM

## 2024-05-20 LAB
ALBUMIN SERPL-MCNC: 4.9 G/DL (ref 3.5–5.7)
ALP SERPL-CCNC: 63 IU/L (ref 34–125)
ALT SERPL-CCNC: 17 IU/L (ref 7–52)
ANION GAP SERPL CALC-SCNC: 10 MEQ/L (ref 3–15)
AST SERPL-CCNC: 21 IU/L (ref 13–39)
BILIRUB SERPL-MCNC: 0.8 MG/DL (ref 0.3–1.2)
BUN SERPL-MCNC: 12 MG/DL (ref 7–25)
CALCIUM SERPL-MCNC: 10.3 MG/DL (ref 8.6–10.3)
CHLORIDE SERPL-SCNC: 98 MEQ/L (ref 98–107)
CO2 SERPL-SCNC: 29 MEQ/L (ref 21–31)
CREAT SERPL-MCNC: 0.7 MG/DL (ref 0.6–1.2)
EGFRCR SERPLBLD CKD-EPI 2021: >60 ML/MIN/1.73M*2
GLUCOSE SERPL-MCNC: 136 MG/DL (ref 70–99)
POTASSIUM SERPL-SCNC: 3.6 MEQ/L (ref 3.5–5.1)
PROT SERPL-MCNC: 7.5 G/DL (ref 6–8.2)
SODIUM SERPL-SCNC: 137 MEQ/L (ref 136–145)

## 2024-05-20 PROCEDURE — 36415 COLL VENOUS BLD VENIPUNCTURE: CPT

## 2024-05-20 PROCEDURE — 80053 COMPREHEN METABOLIC PANEL: CPT

## 2024-05-30 ENCOUNTER — APPOINTMENT (OUTPATIENT)
Dept: LAB | Facility: HOSPITAL | Age: 74
End: 2024-05-30
Attending: FAMILY MEDICINE
Payer: MEDICARE

## 2024-05-30 ENCOUNTER — TRANSCRIBE ORDERS (OUTPATIENT)
Dept: REGISTRATION | Facility: HOSPITAL | Age: 74
End: 2024-05-30

## 2024-05-30 DIAGNOSIS — R53.83 OTHER FATIGUE: ICD-10-CM

## 2024-05-30 DIAGNOSIS — R53.83 OTHER FATIGUE: Primary | ICD-10-CM

## 2024-05-30 PROCEDURE — 36415 COLL VENOUS BLD VENIPUNCTURE: CPT

## 2024-05-30 PROCEDURE — 86617 LYME DISEASE ANTIBODY: CPT

## 2024-05-30 PROCEDURE — 86618 LYME DISEASE ANTIBODY: CPT

## 2024-05-31 LAB — B BURGDOR AB SER IA-ACNC: 0.15 RATIO

## 2024-06-01 LAB
B BURGDOR IGG SER QL IB: NEGATIVE
B BURGDOR IGM SER QL IB: NEGATIVE
B BURGDOR18KD IGG SER QL IB: NORMAL
B BURGDOR23KD IGG SER QL IB: NORMAL
B BURGDOR23KD IGM SER QL IB: NORMAL
B BURGDOR28KD IGG SER QL IB: NORMAL
B BURGDOR30KD IGG SER QL IB: NORMAL
B BURGDOR39KD IGG SER QL IB: NORMAL
B BURGDOR39KD IGM SER QL IB: NORMAL
B BURGDOR41KD IGG SER QL IB: NORMAL
B BURGDOR41KD IGM SER QL IB: NORMAL
B BURGDOR45KD IGG SER QL IB: NORMAL
B BURGDOR58KD IGG SER QL IB: NORMAL
B BURGDOR66KD IGG SER QL IB: NORMAL
B BURGDOR93KD IGG SER QL IB: NORMAL

## 2024-06-03 ENCOUNTER — TELEPHONE (OUTPATIENT)
Dept: COLORECTAL SURGERY | Facility: CLINIC | Age: 74
End: 2024-06-03
Payer: MEDICARE

## 2024-06-03 NOTE — TELEPHONE ENCOUNTER
PT's dr told her to inform us that she lost 7 pounds since January but pt stated she has been watching what she was eating, PCP was concerned.

## 2024-06-03 NOTE — TELEPHONE ENCOUNTER
Spoke with Mague who tells me she has entered [facility], has been conscious of what she is eating, avoiding carbohydrate an has lost roughly 7 lbs since January. She is having regular bowel movements, no bleeding in stool. She is set for [facility] in August.

## 2024-08-01 NOTE — H&P
General Surgery History and Physical    Interval Follow up    HPI     Patient is a 73 y.o. female who presents with history of perforated diverticulitis s/p Alejandro's (outside surgeon) and subsequent reversal by Dr Bowen in 2013. Last colonoscopy was 11/1319 with normal anastomosis and minimal diverticula, DD 3.    Feeling nervous this morning, but otherwise doing well.    Medical History:   Past Medical History:   Diagnosis Date    Anxiety     Cataract     Depression     Diverticulitis of colon     Fibromuscular dysplasia (CMS/HCC)     Glaucoma suspect of both eyes     History of colostomy reversal     Hypertension     Hypothyroidism     Numbness and tingling of right hand     Perforated bowel (CMS/HCC)     Seasonal allergies     Seizures (CMS/HCC) 04/07/2019    Umbilical hernia        Surgical History:   Past Surgical History:   Procedure Laterality Date    COLON SURGERY  10/28/2013    Laparoscopic, (single port) resection of sigmoid colostomy, repair hernia, resection portion of sigmoid colon and rectum    COLON SURGERY  05/21/2014    Colonoscopy to the transverse colon. Poor prep    COLON SURGERY  04/27/2016    Colonoscopy to base of the cecum    COLOSTOMY CLOSURE      HERNIA REPAIR      LAPAROTOMY SALPINGO OOPHORECTOMY Left     SHOULDER SURGERY Right        Social History:   Social History     Socioeconomic History    Marital status:    Tobacco Use    Smoking status: Former     Packs/day: 1.00     Years: 15.00     Additional pack years: 0.00     Total pack years: 15.00     Types: Cigarettes    Smokeless tobacco: Never    Tobacco comments:     quit 35 years ago   Substance and Sexual Activity    Alcohol use: Yes     Alcohol/week: 7.0 standard drinks of alcohol     Types: 7 Glasses of wine per week     Comment: occasionally     Drug use: No       Family History:   Family History   Problem Relation Age of Onset    Heart disease Biological Mother     Pulmonary fibrosis Biological Father        Allergies:  Azithromycin, Levetiracetam, Oxycodone, Amoxicillin trihydrate, Potassium clavulanate, and Sulfa (sulfonamide antibiotics)    Home Medications:      Current Medications:    cloNIDine    KLOR-CON M20    levothyroxine    LORazepam    losartan    multivit-minerals/folic acid (ADULT ONE DAILY GUMMIES ORAL)    OLANZapine    pregabalin    pregabalin    sertraline    wheat dextrin    Review of Systems  Pertinent items are noted in HPI.    Objective        Physicial Exam    General appearance: alert, appears stated age, and cooperative  Head: normocephalic, without obvious abnormality, atraumatic  Eyes: conjunctivae/corneas clear. PERRL, EOM's intact. Fundi benign.  Ears: normal TM's and external ear canals both ears  Nose: Nares normal. Septum midline. Mucosa normal. No drainage or sinus tenderness.  Throat: lips, mucosa, and tongue normal; teeth and gums normal  Neck: no adenopathy, no carotid bruit, no JVD, supple, symmetrical, trachea midline, and thyroid not enlarged, symmetric, no tenderness/mass/nodules  Back: symmetric, no curvature. ROM normal. No CVA tenderness.  Lungs: clear to auscultation bilaterally  Chest wall: no tenderness  Heart: regular rate and rhythm, S1, S2 normal, no murmur, click, rub or gallop  Abdomen: soft, non-tender; bowel sounds normal; no masses, no organomegaly    Extremities: extremities normal, warm and well-perfused; no cyanosis, clubbing, or edema  Pulses: 2+ and symmetric  Skin: Skin color, texture, turgor normal. No rashes or lesions  Lymph nodes: Cervical, supraclavicular, and axillary nodes normal.  Neurologic: Grossly normal        Labs  No new labs.    Imaging  Not applicable      Assessment/Plan     Code Status: Prior        Proceed with colonoscopy

## 2024-08-07 ENCOUNTER — ANESTHESIA EVENT (OUTPATIENT)
Dept: ENDOSCOPY | Facility: HOSPITAL | Age: 74
End: 2024-08-07
Payer: MEDICARE

## 2024-08-07 ENCOUNTER — HOSPITAL ENCOUNTER (OUTPATIENT)
Facility: HOSPITAL | Age: 74
Discharge: HOME | End: 2024-08-07
Attending: COLON & RECTAL SURGERY | Admitting: COLON & RECTAL SURGERY
Payer: MEDICARE

## 2024-08-07 ENCOUNTER — ANESTHESIA (OUTPATIENT)
Dept: ENDOSCOPY | Facility: HOSPITAL | Age: 74
End: 2024-08-07
Payer: MEDICARE

## 2024-08-07 VITALS
BODY MASS INDEX: 21.2 KG/M2 | HEART RATE: 79 BPM | TEMPERATURE: 97.2 F | RESPIRATION RATE: 19 BRPM | DIASTOLIC BLOOD PRESSURE: 78 MMHG | SYSTOLIC BLOOD PRESSURE: 142 MMHG | WEIGHT: 108 LBS | HEIGHT: 60 IN | OXYGEN SATURATION: 98 %

## 2024-08-07 PROCEDURE — 75000014 HC COLONSCOPY DIAGNOSTIC: Performed by: COLON & RECTAL SURGERY

## 2024-08-07 PROCEDURE — 25800000 HC PHARMACY IV SOLUTIONS: Performed by: NURSE ANESTHETIST, CERTIFIED REGISTERED

## 2024-08-07 PROCEDURE — 71000011 HC PACU PHASE 1 EA ADDL MIN: Performed by: COLON & RECTAL SURGERY

## 2024-08-07 PROCEDURE — 25000000 HC PHARMACY GENERAL: Performed by: NURSE ANESTHETIST, CERTIFIED REGISTERED

## 2024-08-07 PROCEDURE — 0DJD8ZZ INSPECTION OF LOWER INTESTINAL TRACT, VIA NATURAL OR ARTIFICIAL OPENING ENDOSCOPIC: ICD-10-PCS | Performed by: COLON & RECTAL SURGERY

## 2024-08-07 PROCEDURE — 71000001 HC PACU PHASE 1 INITIAL 30MIN: Performed by: COLON & RECTAL SURGERY

## 2024-08-07 PROCEDURE — G0121 COLON CA SCRN NOT HI RSK IND: HCPCS | Performed by: COLON & RECTAL SURGERY

## 2024-08-07 PROCEDURE — 63600000 HC DRUGS/DETAIL CODE: Mod: JZ | Performed by: NURSE ANESTHETIST, CERTIFIED REGISTERED

## 2024-08-07 PROCEDURE — 37000002 HC ANESTHESIA MAC: Performed by: COLON & RECTAL SURGERY

## 2024-08-07 RX ORDER — SODIUM CHLORIDE 9 MG/ML
INJECTION, SOLUTION INTRAVENOUS CONTINUOUS PRN
Status: DISCONTINUED | OUTPATIENT
Start: 2024-08-07 | End: 2024-08-07 | Stop reason: SURG

## 2024-08-07 RX ORDER — ONDANSETRON HYDROCHLORIDE 2 MG/ML
4 INJECTION, SOLUTION INTRAVENOUS
Status: CANCELLED | OUTPATIENT
Start: 2024-08-07

## 2024-08-07 RX ORDER — DIPHENHYDRAMINE HCL 50 MG/ML
12.5 VIAL (ML) INJECTION
Status: CANCELLED | OUTPATIENT
Start: 2024-08-07

## 2024-08-07 RX ORDER — MIDAZOLAM HYDROCHLORIDE 2 MG/2ML
INJECTION, SOLUTION INTRAMUSCULAR; INTRAVENOUS AS NEEDED
Status: DISCONTINUED | OUTPATIENT
Start: 2024-08-07 | End: 2024-08-07 | Stop reason: SURG

## 2024-08-07 RX ORDER — PROPOFOL 10 MG/ML
INJECTION, EMULSION INTRAVENOUS CONTINUOUS PRN
Status: DISCONTINUED | OUTPATIENT
Start: 2024-08-07 | End: 2024-08-07 | Stop reason: SURG

## 2024-08-07 RX ORDER — ONDANSETRON HYDROCHLORIDE 2 MG/ML
INJECTION, SOLUTION INTRAVENOUS AS NEEDED
Status: DISCONTINUED | OUTPATIENT
Start: 2024-08-07 | End: 2024-08-07 | Stop reason: SURG

## 2024-08-07 RX ORDER — DEXTROSE 50 % IN WATER (D50W) INTRAVENOUS SYRINGE
25 AS NEEDED
Status: CANCELLED | OUTPATIENT
Start: 2024-08-07

## 2024-08-07 RX ORDER — LIDOCAINE HYDROCHLORIDE 10 MG/ML
INJECTION, SOLUTION EPIDURAL; INFILTRATION; INTRACAUDAL; PERINEURAL AS NEEDED
Status: DISCONTINUED | OUTPATIENT
Start: 2024-08-07 | End: 2024-08-07 | Stop reason: SURG

## 2024-08-07 RX ORDER — DEXTROSE 40 %
15-30 GEL (GRAM) ORAL AS NEEDED
Status: CANCELLED | OUTPATIENT
Start: 2024-08-07

## 2024-08-07 RX ORDER — PROPOFOL 200MG/20ML
SYRINGE (ML) INTRAVENOUS AS NEEDED
Status: DISCONTINUED | OUTPATIENT
Start: 2024-08-07 | End: 2024-08-07 | Stop reason: SURG

## 2024-08-07 RX ORDER — NIFEDIPINE 30 MG/1
30 TABLET, EXTENDED RELEASE ORAL DAILY
COMMUNITY
Start: 2022-03-01

## 2024-08-07 RX ORDER — IBUPROFEN 200 MG
16-32 TABLET ORAL AS NEEDED
Status: CANCELLED | OUTPATIENT
Start: 2024-08-07

## 2024-08-07 RX ADMIN — PROPOFOL 50 MG: 10 INJECTION, EMULSION INTRAVENOUS at 11:52

## 2024-08-07 RX ADMIN — MIDAZOLAM HYDROCHLORIDE 2 MG: 1 INJECTION, SOLUTION INTRAMUSCULAR; INTRAVENOUS at 11:45

## 2024-08-07 RX ADMIN — PROPOFOL 75 MCG/KG/MIN: 10 INJECTION, EMULSION INTRAVENOUS at 11:52

## 2024-08-07 RX ADMIN — ONDANSETRON HYDROCHLORIDE 4 MG: 2 SOLUTION INTRAMUSCULAR; INTRAVENOUS at 11:49

## 2024-08-07 RX ADMIN — SODIUM CHLORIDE: 9 INJECTION, SOLUTION INTRAVENOUS at 11:45

## 2024-08-07 RX ADMIN — LIDOCAINE HYDROCHLORIDE 5 ML: 10 INJECTION, SOLUTION EPIDURAL; INFILTRATION; INTRACAUDAL; PERINEURAL at 11:52

## 2024-08-07 ASSESSMENT — PAIN SCALES - GENERAL: PAIN_LEVEL: 0

## 2024-08-07 ASSESSMENT — ENCOUNTER SYMPTOMS: DEPRESSION: 1

## 2024-08-07 NOTE — OP NOTE
_______________________________________________________________________________  Patient Name: Randi Saul           Procedure Date: 8/7/2024 11:40 AM  MRN: [id number]                     Account Number: [id number]  YOB: 1950             Age: 73  Gender: Female                        Note Status: Finalized  Attending MD: HETAL MARROQUIN MD,  _______________________________________________________________________________  Procedure:             Colonoscopy  Indications:           Screening for colorectal malignant neoplasm  Providers:             HETAL MARROQUIN MD (Doctor)  Referring MD:          LAN MURRAY MD  Requesting Provider:  Medicines:             Monitored Anesthesia Care  Complications:         No immediate complications.  _______________________________________________________________________________  Procedure:             After I obtained informed consent, the scope was  passed under direct vision. Throughout the procedure,  the patient's blood pressure, pulse, and oxygen  saturations were monitored continuously. The  colonoscope was introduced through the anus and  advanced to the cecum, identified by appendiceal  orifice and ileocecal valve. The colonoscopy was  performed without difficulty. The patient tolerated  the procedure well. The quality of the bowel  preparation was good.  Findings:  Normal anastomosis, no evidence of stricture  The exam was otherwise normal throughout the examined colon.  Impression:            - No specimens collected.  Recommendation:        - Discharge patient to home.  - Repeat colonoscopy in 3 - 5 years.  Procedure Code(s):     --- Professional ---  50445, Colonoscopy, flexible; diagnostic, including  collection of specimen(s) by brushing or washing, when  performed (separate procedure)  Diagnosis Code(s):     --- Professional ---  Z12.11, Encounter for screening for malignant neoplasm  of colon  CPT copyright 2021 [employer]. All  rights reserved.  The codes documented in this report are preliminary and upon  review may  be revised to meet current compliance requirements.  Attending Participation:  I was present and participated during the entire procedure, including  non-partida portions.  Hetal Marroquin MD  ________________  HETAL MARROQUIN MD  8/7/2024 12:34:45 PM  This report has been signed electronically.  Number of Addenda: 0  Note Initiated On: 8/7/2024 11:40 AM

## 2024-08-07 NOTE — ANESTHESIOLOGIST PRE-PROCEDURE ATTESTATION
Pre-Procedure Patient Identification:  I am the Primary Anesthesiologist and have identified the patient on 08/07/24 at 11:20 AM.   I have confirmed the procedure(s) will be performed by the following surgeon/proceduralist Jeff Bowen MD.

## 2024-08-07 NOTE — ANESTHESIA POSTPROCEDURE EVALUATION
Patient: Randi Saul    Procedure Summary       Date: 08/07/24 Room / Location: [facility] (D) / [facility]    Anesthesia Start: 1145 Anesthesia Stop: 1238    Procedure: COLONOSCOPY (Anus) Diagnosis:       Encounter for screening for malignant neoplasm of colon      (COLONOSCOPY)    Providers: Jeff Bowen MD Responsible Provider: Roosevelt Abreu MD    Anesthesia Type: MAC ASA Status: 3            Anesthesia Type: MAC  PACU Vitals  8/7/2024 1229 - 8/7/2024 1246        8/7/2024  1230             BP: 123/82    Temp: 36.2 °C (97.2 °F)    Pulse: 72    Resp: 18    SpO2: 99 %              Anesthesia Post Evaluation    Pain score: 0  Pain management: adequate  Patient location during evaluation: PACU  Patient participation: complete - patient participated  Level of consciousness: awake and alert  Cardiovascular status: acceptable  Airway Patency: adequate  Respiratory status: acceptable  Hydration status: acceptable  Anesthetic complications: no

## 2024-08-07 NOTE — ANESTHESIA PREPROCEDURE EVALUATION
Relevant Problems   CARDIOVASCULAR   (+) Essential hypertension      NEUROLOGY   (+) Anxiety   (+) Epilepsy (CMS/HCC)      Other   (+) Hypothyroid     Problem List  Current as of 08/07/24 1114  Anxiety   Diverticulitis   Epilepsy (CMS/HCC)   Essential hypertension   Fibromuscular dysplasia (CMS/HCC)   Hypothyroid   Lightheaded   Lightheadedness   S/P shoulder replacement, right   Status post shoulder replacement   Status post total shoulder arthroplasty     Medical History  Current as of 08/07/24 1115  History Comments   Diverticulitis of colon    Hypertension    Hypothyroidism    Fibromuscular dysplasia (CMS/HCC)    Anxiety    Numbness and tingling of right hand    History of colostomy reversal    Umbilical hernia    Perforated bowel (CMS/HCC)    Cataract    Glaucoma suspect of both eyes    Seizures (CMS/HCC)    Depression    Seasonal allergies      Anesthesia ROS/MED HX      Neuro/Psych    seizures   Depression   Anxiety  Cardiovascular   hypertension   Echocardiogram reviewed and ECG reviewed   Normal ECG  Endo/Other   Hypothyroidism       Past Surgical History:   Procedure Laterality Date    COLON SURGERY  10/28/2013    Laparoscopic, (single port) resection of sigmoid colostomy, repair hernia, resection portion of sigmoid colon and rectum    COLON SURGERY  05/21/2014    Colonoscopy to the transverse colon. Poor prep    COLON SURGERY  04/27/2016    Colonoscopy to base of the cecum    COLOSTOMY CLOSURE      HERNIA REPAIR      LAPAROTOMY SALPINGO OOPHORECTOMY Left     SHOULDER SURGERY Right      I have reviewed the following records for  Randi Saul.    Lab Results   Component Value Date    WBC 5.71 04/24/2024    HGB 13.1 04/24/2024    HCT 38.1 04/24/2024    MCV 95.5 04/24/2024     04/24/2024     Lab Results   Component Value Date    GLUCOSE 136 (H) 05/20/2024    CALCIUM 10.3 05/20/2024     05/20/2024    K 3.6 05/20/2024    CO2 29 05/20/2024    CL 98 05/20/2024    BUN 12 05/20/2024    CREATININE  "0.7 05/20/2024     No results found for: \"HCGPREGUR\", \"PREGSERUM\", \"HCG\", \"HCGQUANT\"  @LABRCNTIP(aptt,inr,ptt)  )  No current facility-administered medications for this encounter.     Prior to Admission medications    Medication Sig Start Date End Date Taking? Authorizing Provider   levothyroxine (SYNTHROID) 100 mcg tablet Take 100 mcg by mouth daily.   Yes Angel Kang MD   LORazepam (ATIVAN) 0.5 mg tablet Take 0.5 mg by mouth 3 (three) times a day.    Yes Angel Kang MD   losartan (COZAAR) 50 mg tablet Take 50 mg by mouth 2 (two) times a day.    Yes Angel Kang MD   NIFEdipine XL (PROCARDIA XL) 30 mg 24 hr tablet Take 30 mg by mouth daily. 3/1/22  Yes Sofiya Kang MD   pregabalin (LYRICA) 50 mg capsule Take 50 mg by mouth 2 (two) times a day. In addition to 75 mg Lyrica each evening.   Yes Angel Kang MD   sertraline (ZOLOFT) 25 mg tablet Take 25 mg by mouth daily.   Yes Angel Kang MD   cloNIDine (CATAPRES) 0.1 mg tablet Take 0.1 mg by mouth as needed. Give  For  & HIGHER   Pt is not using currently    Provider, Historical, MD   KLOR-CON M20 20 mEq CR tablet Take 20 mEq by mouth once daily. with food 9/23/19   Angel Kang MD   multivit-minerals/folic acid (ADULT ONE DAILY GUMMIES ORAL) Take 1 Dose by mouth every morning.    Angel Kang MD   OLANZapine (ZYPREXA) 2.5 mg tablet 1/2 TABLET BY MOUTH AT BEDTIME FOR 6 DAYS THEN 1 TABLET AT BEDTIME 10/18/19   Angel Kang MD   pregabalin (LYRICA) 75 mg capsule Take 75 mg by mouth every evening. In addition to Lyrica 50 mg every morning and afternoon    Angel Kang MD   wheat dextrin 3 gram/3.5 gram powder in packet Take by mouth once daily.     Angel Kang MD Boris Aronzon, MD      Physical Exam    Airway   Mallampati: IV   TM distance: >3 FB   Neck ROM: full  Cardiovascular - normal   Rhythm: regular   Rate: normalPulmonary - normal   clear to " auscultation  Dental - normal    Blood pressure (!) 165/76, pulse 71, temperature 36.4 °C (97.6 °F), temperature source Temporal, resp. rate 18, height 1.524 m (5'), weight 49 kg (108 lb), SpO2 100%, not currently breastfeeding.      Anesthesia Plan    Plan: MAC      Lines and Monitors: PIV     3 ASA  Blood Products:     Use of Blood Products Discussed: Yes   Anesthetic plan and risks discussed with: patient  Postop Plan:   Patient Disposition: phase II then home   Pain Management: IV analgesics

## 2024-08-23 ENCOUNTER — APPOINTMENT (OUTPATIENT)
Dept: LAB | Facility: HOSPITAL | Age: 74
End: 2024-08-23
Attending: FAMILY MEDICINE
Payer: MEDICARE

## 2024-08-23 ENCOUNTER — TRANSCRIBE ORDERS (OUTPATIENT)
Dept: REGISTRATION | Facility: HOSPITAL | Age: 74
End: 2024-08-23

## 2024-08-23 DIAGNOSIS — E87.1 HYPO-OSMOLALITY AND HYPONATREMIA: ICD-10-CM

## 2024-08-23 DIAGNOSIS — E03.9 HYPOTHYROIDISM, UNSPECIFIED: ICD-10-CM

## 2024-08-23 DIAGNOSIS — R53.83 OTHER FATIGUE: Primary | ICD-10-CM

## 2024-08-23 DIAGNOSIS — R53.83 OTHER FATIGUE: ICD-10-CM

## 2024-08-23 LAB
ALBUMIN SERPL-MCNC: 4.8 G/DL (ref 3.5–5.7)
ALP SERPL-CCNC: 63 IU/L (ref 34–125)
ALT SERPL-CCNC: 19 IU/L (ref 7–52)
ANION GAP SERPL CALC-SCNC: 10 MEQ/L (ref 3–15)
AST SERPL-CCNC: 21 IU/L (ref 13–39)
BASOPHILS # BLD: 0.03 K/UL (ref 0.01–0.1)
BASOPHILS NFR BLD: 0.8 %
BILIRUB SERPL-MCNC: 0.6 MG/DL (ref 0.3–1.2)
BUN SERPL-MCNC: 12 MG/DL (ref 7–25)
CALCIUM SERPL-MCNC: 10.1 MG/DL (ref 8.6–10.3)
CHLORIDE SERPL-SCNC: 91 MEQ/L (ref 98–107)
CO2 SERPL-SCNC: 27 MEQ/L (ref 21–31)
CREAT SERPL-MCNC: 0.8 MG/DL (ref 0.6–1.2)
DIFFERENTIAL METHOD BLD: ABNORMAL
EGFRCR SERPLBLD CKD-EPI 2021: >60 ML/MIN/1.73M*2
EOSINOPHIL # BLD: 0.01 K/UL (ref 0.04–0.36)
EOSINOPHIL NFR BLD: 0.3 %
ERYTHROCYTE [DISTWIDTH] IN BLOOD BY AUTOMATED COUNT: 11.5 % (ref 11.7–14.4)
GLUCOSE SERPL-MCNC: 73 MG/DL (ref 70–99)
HCT VFR BLD AUTO: 38.7 % (ref 35–45)
HGB BLD-MCNC: 13.9 G/DL (ref 11.8–15.7)
IMM GRANULOCYTES # BLD AUTO: 0.01 K/UL (ref 0–0.08)
IMM GRANULOCYTES NFR BLD AUTO: 0.3 %
LYMPHOCYTES # BLD: 0.68 K/UL (ref 1.2–3.5)
LYMPHOCYTES NFR BLD: 17.3 %
MCH RBC QN AUTO: 32.6 PG (ref 28–33.2)
MCHC RBC AUTO-ENTMCNC: 35.9 G/DL (ref 32.2–35.5)
MCV RBC AUTO: 90.8 FL (ref 83–98)
MONOCYTES # BLD: 0.32 K/UL (ref 0.28–0.8)
MONOCYTES NFR BLD: 8.2 %
NEUTROPHILS # BLD: 2.87 K/UL (ref 1.7–7)
NEUTS SEG NFR BLD: 73.1 %
NRBC BLD-RTO: 0 %
PDW BLD AUTO: 9.2 FL (ref 9.4–12.3)
PLAT MORPH BLD: NORMAL
PLATELET # BLD AUTO: 275 K/UL (ref 150–369)
PLATELET # BLD EST: ABNORMAL 10*3/UL
PLATELET CLUMP BLD QL SMEAR: PRESENT
POTASSIUM SERPL-SCNC: 3.2 MEQ/L (ref 3.5–5.1)
PROT SERPL-MCNC: 7.3 G/DL (ref 6–8.2)
RBC # BLD AUTO: 4.26 M/UL (ref 3.93–5.22)
RBC MORPH BLD: NORMAL
SODIUM SERPL-SCNC: 128 MEQ/L (ref 136–145)
WBC # BLD AUTO: 3.92 K/UL (ref 3.8–10.5)

## 2024-08-23 PROCEDURE — 85025 COMPLETE CBC W/AUTO DIFF WBC: CPT

## 2024-08-23 PROCEDURE — 36415 COLL VENOUS BLD VENIPUNCTURE: CPT

## 2024-08-23 PROCEDURE — 86618 LYME DISEASE ANTIBODY: CPT

## 2024-08-23 PROCEDURE — 80053 COMPREHEN METABOLIC PANEL: CPT

## 2024-08-26 LAB — B BURGDOR AB SER IA-ACNC: 0.12 RATIO

## 2024-08-27 NOTE — ASSESSMENT & PLAN NOTE
Cont Lyrica  Neuro consult is appr: If she has additional episodes in the hospital, and these are not related to elevated blood pressure spikes, I would suggest a transfer to the [place] epilepsy unit for continuous EEG monitoring, seizure classification, and to adjust her current medications.   To monitor   Per Dr Fox he called in another RX of Bactrim for 7 days. Advised any increased swelling or redness to contact the office. Continue daily dressing changes with either triple antibiotic or betadine. Patient was notified and states understanding.

## 2024-09-05 ENCOUNTER — APPOINTMENT (OUTPATIENT)
Dept: LAB | Facility: HOSPITAL | Age: 74
End: 2024-09-05
Attending: FAMILY MEDICINE
Payer: MEDICARE

## 2024-09-05 DIAGNOSIS — E87.1 HYPO-OSMOLALITY AND HYPONATREMIA: ICD-10-CM

## 2024-09-05 DIAGNOSIS — R53.83 OTHER FATIGUE: ICD-10-CM

## 2024-09-05 DIAGNOSIS — E03.9 HYPOTHYROIDISM, UNSPECIFIED: ICD-10-CM

## 2024-09-05 LAB
ALBUMIN SERPL-MCNC: 4.9 G/DL (ref 3.5–5.7)
ALP SERPL-CCNC: 59 IU/L (ref 34–125)
ALT SERPL-CCNC: 16 IU/L (ref 7–52)
ANION GAP SERPL CALC-SCNC: 10 MEQ/L (ref 3–15)
AST SERPL-CCNC: 19 IU/L (ref 13–39)
BILIRUB SERPL-MCNC: 0.8 MG/DL (ref 0.3–1.2)
BUN SERPL-MCNC: 14 MG/DL (ref 7–25)
CALCIUM SERPL-MCNC: 10.1 MG/DL (ref 8.6–10.3)
CHLORIDE SERPL-SCNC: 94 MEQ/L (ref 98–107)
CO2 SERPL-SCNC: 28 MEQ/L (ref 21–31)
CREAT SERPL-MCNC: 0.8 MG/DL (ref 0.6–1.2)
EGFRCR SERPLBLD CKD-EPI 2021: >60 ML/MIN/1.73M*2
GLUCOSE SERPL-MCNC: 96 MG/DL (ref 70–99)
POTASSIUM SERPL-SCNC: 3.5 MEQ/L (ref 3.5–5.1)
PROT SERPL-MCNC: 7.2 G/DL (ref 6–8.2)
SODIUM SERPL-SCNC: 132 MEQ/L (ref 136–145)
TSH SERPL DL<=0.05 MIU/L-ACNC: 1.13 MIU/L (ref 0.34–5.6)

## 2024-09-05 PROCEDURE — 84443 ASSAY THYROID STIM HORMONE: CPT

## 2024-09-05 PROCEDURE — 36415 COLL VENOUS BLD VENIPUNCTURE: CPT

## 2024-09-05 PROCEDURE — 80053 COMPREHEN METABOLIC PANEL: CPT

## 2024-09-16 ENCOUNTER — TRANSCRIBE ORDERS (OUTPATIENT)
Dept: REGISTRATION | Facility: HOSPITAL | Age: 74
End: 2024-09-16

## 2024-09-16 ENCOUNTER — APPOINTMENT (OUTPATIENT)
Dept: LAB | Facility: HOSPITAL | Age: 74
End: 2024-09-16
Attending: INTERNAL MEDICINE
Payer: MEDICARE

## 2024-09-16 DIAGNOSIS — E06.3 AUTOIMMUNE THYROIDITIS: Primary | ICD-10-CM

## 2024-09-16 DIAGNOSIS — E06.3 AUTOIMMUNE THYROIDITIS: ICD-10-CM

## 2024-09-16 LAB
T4 FREE SERPL-MCNC: 0.95 NG/DL (ref 0.58–1.64)
TSH SERPL DL<=0.05 MIU/L-ACNC: 1.48 MIU/L (ref 0.34–5.6)
TSH SERPL DL<=0.05 MIU/L-ACNC: 1.48 MIU/L (ref 0.34–5.6)

## 2024-09-16 PROCEDURE — 84443 ASSAY THYROID STIM HORMONE: CPT

## 2024-09-16 PROCEDURE — 36415 COLL VENOUS BLD VENIPUNCTURE: CPT

## 2024-09-16 PROCEDURE — 84439 ASSAY OF FREE THYROXINE: CPT

## 2024-11-03 ENCOUNTER — HOSPITAL ENCOUNTER (OUTPATIENT)
Facility: CLINIC | Age: 74
Discharge: HOME | End: 2024-11-03
Attending: HOSPITALIST
Payer: MEDICARE

## 2024-11-03 VITALS
HEART RATE: 70 BPM | TEMPERATURE: 97.2 F | DIASTOLIC BLOOD PRESSURE: 70 MMHG | SYSTOLIC BLOOD PRESSURE: 110 MMHG | OXYGEN SATURATION: 98 % | RESPIRATION RATE: 18 BRPM

## 2024-11-03 DIAGNOSIS — L08.9 INFECTED WOUND: Primary | ICD-10-CM

## 2024-11-03 DIAGNOSIS — T14.8XXA INFECTED WOUND: Primary | ICD-10-CM

## 2024-11-03 PROCEDURE — 99213 OFFICE O/P EST LOW 20 MIN: CPT | Performed by: HOSPITALIST

## 2024-11-03 RX ORDER — CEFDINIR 300 MG/1
600 CAPSULE ORAL 2 TIMES DAILY
Qty: 28 CAPSULE | Refills: 0 | Status: SHIPPED | OUTPATIENT
Start: 2024-11-03 | End: 2024-11-04 | Stop reason: DRUGHIGH

## 2024-11-03 NOTE — DISCHARGE INSTRUCTIONS
Continue with wound care  Omnicef 2 tablets twice a day for 7 days take with a full stomach causes GI upset  Continue home medications  Follow-up with Dr. Badillo 1 week for suture removal

## 2024-11-03 NOTE — ED PROVIDER NOTES
History  Chief Complaint   Patient presents with    Fall    Laceration      is nervous that if he takes it off it will continue to bleed     HPI  73-year-old female history of mood disorder HTN hypothyroidism.  Patient had fallen yesterday and sustained a chin laceration all labs and radiology studies were negative.  Patient has noted a bleeding over the suture site as well as some loosening of her dentition which she will follow-up with a dentist in the next 2 to 3 days.  Past Medical History:   Diagnosis Date    Anxiety     Cataract     Depression     Diverticulitis of colon     Fibromuscular dysplasia (CMS/HCC)     Glaucoma suspect of both eyes     History of colostomy reversal     Hypertension     Hypothyroidism     Numbness and tingling of right hand     Perforated bowel (CMS/HCC)     Seasonal allergies     Seizures (CMS/HCC) 04/07/2019    Umbilical hernia        Past Surgical History   Procedure Laterality Date    Colon surgery  10/28/2013    Laparoscopic, (single port) resection of sigmoid colostomy, repair hernia, resection portion of sigmoid colon and rectum    Colon surgery  05/21/2014    Colonoscopy to the transverse colon. Poor prep    Colon surgery  04/27/2016    Colonoscopy to base of the cecum    COLONOSCOPY N/A 8/7/2024    Performed by Jeff Bowen MD at [facility]    Colonoscopy N/A 11/13/2019    Performed by Jeff Bowen MD at [facility]    Colostomy closure      Hernia repair      Laparotomy salpingo oophorectomy Left     Right Anatomic Total Shoulder Arthroplasty, biceps tenodesis Right 4/10/2019    Performed by Tanner Lunsford MD at [facility] OR [facility]    Shoulder surgery Right        Family History   Problem Relation Name Age of Onset    Heart disease Biological Mother      Pulmonary fibrosis Biological Father         Social History     Tobacco Use    Smoking status: Former     Current packs/day: 1.00     Average packs/day: 1 pack/day for 15.0 years (15.0 ttl pk-yrs)     Types: Cigarettes     Smokeless tobacco: Never    Tobacco comments:     quit 35 years ago   Substance Use Topics    Alcohol use: Yes     Alcohol/week: 7.0 standard drinks of alcohol     Types: 7 Glasses of wine per week     Comment: occasionally     Drug use: No       Review of Systems    Physical Exam  ED Triage Vitals [11/03/24 1116]   Temp Heart Rate Resp BP SpO2   36.2 °C (97.2 °F) 70 18 110/70 98 %      Temp Source Heart Rate Source Patient Position BP Location FiO2 (%) (Set)   Oral -- Sitting Left upper arm --       Physical Exam    HEENT PE RRL, no nystagmus, no icteric sclerae, good EOM  Heart RRR S1-S2 no murmurs  Lungs CTA no wheezing no rales no retractions no rhonchi  Extremities good pulses, no edema, good range of motion,  Chin wound clean dry intact with mild bleeding no edema discharge noted  Procedures  Procedures    UC Course     Infected chin wound  MDM    Discharged home good and stable  Continue with wound care  Omnicef 2 tablets twice a day for 7 days take with a full stomach causes GI upset  Continue home medications  Follow-up with Dr. Badillo 1 week for suture removal             Krista Dutton MD  11/03/24 1149

## 2024-11-04 ENCOUNTER — TELEPHONE (OUTPATIENT)
Dept: URGENT CARE | Facility: CLINIC | Age: 74
End: 2024-11-04
Payer: MEDICARE

## 2024-11-04 RX ORDER — CEFDINIR 300 MG/1
300 CAPSULE ORAL 2 TIMES DAILY
Qty: 14 CAPSULE | Refills: 0 | Status: SHIPPED | OUTPATIENT
Start: 2024-11-04 | End: 2024-11-11

## 2024-11-04 NOTE — ED PROVIDER NOTES
History  Chief Complaint   Patient presents with    Fall    Laceration      is nervous that if he takes it off it will continue to bleed     HPI    Past Medical History:   Diagnosis Date    Anxiety     Cataract     Depression     Diverticulitis of colon     Fibromuscular dysplasia (CMS/HCC)     Glaucoma suspect of both eyes     History of colostomy reversal     Hypertension     Hypothyroidism     Numbness and tingling of right hand     Perforated bowel (CMS/HCC)     Seasonal allergies     Seizures (CMS/HCC) 04/07/2019    Umbilical hernia        Past Surgical History   Procedure Laterality Date    Colon surgery  10/28/2013    Laparoscopic, (single port) resection of sigmoid colostomy, repair hernia, resection portion of sigmoid colon and rectum    Colon surgery  05/21/2014    Colonoscopy to the transverse colon. Poor prep    Colon surgery  04/27/2016    Colonoscopy to base of the cecum    COLONOSCOPY N/A 8/7/2024    Performed by Jeff Bowen MD at [facility]    Colonoscopy N/A 11/13/2019    Performed by Jeff Bowen MD at [facility]    Colostomy closure      Hernia repair      Laparotomy salpingo oophorectomy Left     Right Anatomic Total Shoulder Arthroplasty, biceps tenodesis Right 4/10/2019    Performed by Tanner L[facility]ford MD at [facility]    Shoulder surgery Right        Family History   Problem Relation Name Age of Onset    Heart disease Biological Mother      Pulmonary fibrosis Biological Father         Social History     Tobacco Use    Smoking status: Former     Current packs/day: 1.00     Average packs/day: 1 pack/day for 15.0 years (15.0 ttl pk-yrs)     Types: Cigarettes    Smokeless tobacco: Never    Tobacco comments:     quit 35 years ago   Substance Use Topics    Alcohol use: Yes     Alcohol/week: 7.0 standard drinks of alcohol     Types: 7 Glasses of wine per week     Comment: occasionally     Drug use: No       Review of Systems    Physical Exam  ED Triage Vitals [11/03/24 1116]   Temp Heart Rate  Resp BP SpO2   36.2 °C (97.2 °F) 70 18 110/70 98 %      Temp Source Heart Rate Source Patient Position BP Location FiO2 (%) (Set)   Oral -- Sitting Left upper arm --       Physical Exam      Procedures  Procedures    UC Course       Medical Decision Making                   Meño Leal, DO  11/04/24 8312

## 2024-11-04 NOTE — ED NOTES
Patient and  came in that she was seen here yesterday and given cefdinir and given high dose as per the pharmacist ,so talked to Dr Dutton and clarified and as per him I sent the new script to the pharmacy.as per chart she did not tolerate augmentin but seems only GI intolerance ,advised them to contract us if she gets any problems with cefdinir     Benita Robison MD  11/04/24 2939

## 2025-05-05 ENCOUNTER — TRANSCRIBE ORDERS (OUTPATIENT)
Dept: SCHEDULING | Age: 75
End: 2025-05-05

## 2025-05-05 DIAGNOSIS — R56.9 GENERALIZED-ONSET SEIZURES (CMS/HCC): Primary | ICD-10-CM

## 2025-05-21 ENCOUNTER — HOSPITAL ENCOUNTER (OUTPATIENT)
Dept: NEUROLOGY | Facility: HOSPITAL | Age: 75
Discharge: HOME | End: 2025-05-21
Attending: PSYCHIATRY & NEUROLOGY
Payer: MEDICARE

## 2025-05-21 PROCEDURE — 95813 EEG EXTND MNTR 61-119 MIN: CPT

## (undated) DEVICE — SPONGE LAP DISPOSABLE 18X18

## (undated) DEVICE — APPLICATOR CHLORAPREP 26ML ORANGE TINT

## (undated) DEVICE — MAT ABSORBANT SUPERMAT 50 X 56

## (undated) DEVICE — BLADE SAGITTAL #127 STABLECUT

## (undated) DEVICE — ***USE 143206***SYRINGE BULB

## (undated) DEVICE — GUIDE WIRE

## (undated) DEVICE — SUTURE 2N 30" X 1MM HC-5 CTY WH

## (undated) DEVICE — SOLN IRRIG STER WATER 1000ML

## (undated) DEVICE — NEEDLE HALF CIRCLE TAPER MED GALLES FASCIA

## (undated) DEVICE — PENCIL ESU HANDSWITCHING W/HOL

## (undated) DEVICE — MANIFOLD FOUR PORT NEPTUNE

## (undated) DEVICE — ***USE 124736***SYRINGE TOOMEY

## (undated) DEVICE — ***USE 57698*** SLEEVE FLOWTRON DVT CALF SINGLE USE

## (undated) DEVICE — SURGICEL 2X14

## (undated) DEVICE — SET HANDPIECE INTERPULSE

## (undated) DEVICE — FACEMASK FOR SHOULDER POSITONER

## (undated) DEVICE — COVER LIGHTHANDLE

## (undated) DEVICE — MIX-EVAC HIGH VACUUM KIT

## (undated) DEVICE — SOLN IRRIG .9%SOD 3L

## (undated) DEVICE — SUTURE VICRYL PLUS 2-0 VCP869H

## (undated) DEVICE — BLANKET UPPER BODY BAIR HUGGER

## (undated) DEVICE — SUTURE ETHIBOND 5 MB47G CCS 4PK

## (undated) DEVICE — GLOVE SZ 8 LINER PROTEXIS PI BL

## (undated) DEVICE — GOWN SURGICAL REINFORCED X-LAR

## (undated) DEVICE — DRAPE-U-1015

## (undated) DEVICE — HOOD FLYTE SURGICOOL

## (undated) DEVICE — BLADE SCALPEL #15

## (undated) DEVICE — SOLN IV 0.9% NSS 1000ML

## (undated) DEVICE — PACK MLHS SHOULDER STANDARD

## (undated) DEVICE — DRAPE LARGE REVERSE FOLD

## (undated) DEVICE — SUTURE MONOCRYL 3-0  Y497G

## (undated) DEVICE — DRAPE IOBAN

## (undated) DEVICE — KIT SHOULDER STABILIZATION SPIDER

## (undated) DEVICE — VEST STERILE

## (undated) DEVICE — Device

## (undated) DEVICE — PAD GROUND ELECTROSURGICAL W/CORD

## (undated) DEVICE — TIP SUCTION YANKAUER

## (undated) DEVICE — GLOVE SZ 8.5 PROTEXIS PI

## (undated) DEVICE — ADHESIVE SKIN DERMABOND ADVANCED 0.7ML

## (undated) DEVICE — TIP SUCTION FRAZIER 12FR

## (undated) DEVICE — DRESSING TEGADERM 4X4 3/4